# Patient Record
Sex: FEMALE | Race: WHITE | NOT HISPANIC OR LATINO | URBAN - METROPOLITAN AREA
[De-identification: names, ages, dates, MRNs, and addresses within clinical notes are randomized per-mention and may not be internally consistent; named-entity substitution may affect disease eponyms.]

---

## 2018-11-09 ENCOUNTER — TELEPHONE (OUTPATIENT)
Dept: FAMILY MEDICINE | Facility: CLINIC | Age: 58
End: 2018-11-09

## 2018-11-09 NOTE — TELEPHONE ENCOUNTER
Patient states she left a message Monday 11/5 for a refill of her lisinopril 10 mg.  I see no such message in her chart and she is out of medication.  She has been using her husbands medication.  Her records have not been transferred although she does have an appointment on Monday 11/12.  I suggested she contract the pharmacy since Dr Thakkar is out of the office and since her records are not transferred, technically she is still a patient of [facility].  She was upset that the message was not in her chart and will try the pharmacy.

## 2018-11-13 RX ORDER — LISINOPRIL 10 MG/1
10 TABLET ORAL DAILY
Qty: 90 TABLET | Refills: 0 | Status: SHIPPED | OUTPATIENT
Start: 2018-11-13 | End: 2019-01-17

## 2018-11-13 NOTE — TELEPHONE ENCOUNTER
Patient no showed for her visit today, see the note below.  Please see if she is having care provided by another doctor at this time.

## 2018-11-13 NOTE — TELEPHONE ENCOUNTER
Tried to reach the pt today.  Went into VM  Asked that she return my call when she can,  Want to see if she needs assistance getting back into Dr Guillermo schedule, or possibly she is recv care from another provider to which I can advise Dr Thakkar.  My direct line left

## 2018-11-13 NOTE — TELEPHONE ENCOUNTER
Dr Thakkar  Pt called back  Was able to discuss with her multiple things.  She was a little upset at first but after we spoke in depth she felt much better  Pt is still in need of her BP medication,  I clarified the name and the dose, pt take once daily,   And she typically gets a 90 day.  Pharmacy has been updated.    We do not have her records yet, she will fill out authorization for us when she comes in.  Transferred her to the front for them to reschedule her.  Per pt she never recv a reminder call and has been going through a lot with the loss of a parent.   Dr Thakkar script is ready to go if in agreement, please send ty

## 2019-01-17 ENCOUNTER — OFFICE VISIT (OUTPATIENT)
Dept: FAMILY MEDICINE | Facility: CLINIC | Age: 59
End: 2019-01-17
Payer: COMMERCIAL

## 2019-01-17 VITALS
DIASTOLIC BLOOD PRESSURE: 78 MMHG | SYSTOLIC BLOOD PRESSURE: 130 MMHG | HEART RATE: 91 BPM | TEMPERATURE: 98.3 F | RESPIRATION RATE: 16 BRPM | WEIGHT: 185 LBS | OXYGEN SATURATION: 98 %

## 2019-01-17 DIAGNOSIS — J01.90 ACUTE SINUSITIS, RECURRENCE NOT SPECIFIED, UNSPECIFIED LOCATION: Primary | ICD-10-CM

## 2019-01-17 DIAGNOSIS — I10 ESSENTIAL HYPERTENSION: ICD-10-CM

## 2019-01-17 PROBLEM — R01.1 CARDIAC MURMUR: Status: ACTIVE | Noted: 2019-01-17

## 2019-01-17 PROBLEM — F41.9 ANXIETY: Status: ACTIVE | Noted: 2019-01-17

## 2019-01-17 PROBLEM — M17.9 OSTEOARTHRITIS OF KNEE: Status: ACTIVE | Noted: 2019-01-17

## 2019-01-17 PROBLEM — E66.09 OBESITY DUE TO EXCESS CALORIES: Status: ACTIVE | Noted: 2019-01-17

## 2019-01-17 PROCEDURE — 99214 OFFICE O/P EST MOD 30 MIN: CPT | Performed by: FAMILY MEDICINE

## 2019-01-17 RX ORDER — LISINOPRIL 10 MG/1
10 TABLET ORAL DAILY
Qty: 90 TABLET | Refills: 0 | COMMUNITY
Start: 2019-01-17 | End: 2019-01-29 | Stop reason: SDUPTHER

## 2019-01-17 RX ORDER — AZITHROMYCIN 250 MG/1
TABLET, FILM COATED ORAL
Qty: 6 TABLET | Refills: 0 | Status: SHIPPED | OUTPATIENT
Start: 2019-01-17 | End: 2019-02-12 | Stop reason: SDUPTHER

## 2019-01-17 ASSESSMENT — ENCOUNTER SYMPTOMS
SHORTNESS OF BREATH: 0
PALPITATIONS: 0
ARTHRALGIAS: 1
UNEXPECTED WEIGHT CHANGE: 0

## 2019-01-17 NOTE — PROGRESS NOTES
Chief Complaint  Chief Complaint   Patient presents with   • Sinusitis     since the New Year   • earpain     on/off since the New Year       History of Present Illness  Former pt of mine at [facility] and no records avail. Has had 3 weeks of nasal mary ellen, ear pressure, facial pressure and fatigue. Has had cough from PND. Had chills and sweats and in last few days has increased green nasal d/c.           Current Outpatient Prescriptions   Medication Sig Dispense Refill   • lisinopril (PRINIVIL) 10 mg tablet Take 1 tablet (10 mg total) by mouth daily. 90 tablet 0   • azithromycin (ZITHROMAX Z-ELAYNE) 250 mg tablet Take 2 tablets the first day, then 1 tablet daily for 4 days. 6 tablet 0     No current facility-administered medications for this visit.        Patient Active Problem List   Diagnosis   • Essential hypertension       Past Medical History:   Diagnosis Date   • Essential hypertension 1/17/2019   • Hypertension        Past Surgical History:   Procedure Laterality Date   • HERNIA REPAIR     • MOUTH SURGERY         Family History   Problem Relation Age of Onset   • Hypertension Mother    • Thyroid disease Sister    • Hypertension Brother    • Thyroid disease Brother    • Hypertension Maternal Grandfather    • Breast cancer Mother's Sister    • Testicular cancer Cousin        Social History     Social History   • Marital status:      Spouse name: N/A   • Number of children: N/A   • Years of education: N/A     Occupational History   • Not on file.     Social History Main Topics   • Smoking status: Never Smoker   • Smokeless tobacco: Never Used   • Alcohol use Yes      Comment: occass   • Drug use: No   • Sexual activity: Not on file     Other Topics Concern   • Not on file     Social History Narrative   • No narrative on file       Allergies   Allergen Reactions   • Penicillins        Review of Systems   Constitutional: Negative for unexpected weight change.        As in HPI   HENT: Negative for ear pain.     Respiratory: Negative for shortness of breath.    Cardiovascular: Negative for chest pain and palpitations.   Musculoskeletal: Positive for arthralgias.   Skin: Negative for rash.       Vitals:    01/17/19 1048   BP: 130/78   BP Location: Left upper arm   Patient Position: Sitting   Pulse: 91   Resp: 16   Temp: 36.8 °C (98.3 °F)   SpO2: 98%   Weight: 83.9 kg (185 lb)     There is no height or weight on file to calculate BMI.    Physical Exam   Constitutional: She is oriented to person, place, and time. She appears well-developed and well-nourished.   HENT:   Head: Normocephalic and atraumatic.   Right Ear: Tympanic membrane normal.   Left Ear: Tympanic membrane normal.   Nose: Rhinorrhea present.   Mouth/Throat: Oropharynx is clear and moist.   Eyes: Conjunctivae are normal.   Cardiovascular: Normal rate and regular rhythm.    Murmur (2/6) heard.  Pulmonary/Chest: Effort normal and breath sounds normal. She has no wheezes. She has no rales.   Musculoskeletal: She exhibits no edema.   Lymphadenopathy:     She has no cervical adenopathy.   Neurological: She is oriented to person, place, and time.   Skin: No rash noted.   Psychiatric: She has a normal mood and affect. Her behavior is normal.       Problem List Items Addressed This Visit     Essential hypertension     Blood pressure control looks acceptable and continue on the same medication and schedule PE soon.         Relevant Medications    lisinopril (PRINIVIL) 10 mg tablet      Other Visit Diagnoses     Acute sinusitis, recurrence not specified, unspecified location    -  Primary    Treat with antibiotic and counseled and warning signs discussed.  Follow-up if not improving.    Relevant Medications    azithromycin (ZITHROMAX Z-ELAYNE) 250 mg tablet          No Follow-up on file.          Sue Thakkar MD  [facility]  Family Medicine in [place]  [address] [place] [address],  [address]  [place]  [address]  P: [phone]  F: [phone]

## 2019-02-12 ENCOUNTER — OFFICE VISIT (OUTPATIENT)
Dept: FAMILY MEDICINE | Facility: CLINIC | Age: 59
End: 2019-02-12
Payer: COMMERCIAL

## 2019-02-12 ENCOUNTER — TELEPHONE (OUTPATIENT)
Dept: FAMILY MEDICINE | Facility: CLINIC | Age: 59
End: 2019-02-12

## 2019-02-12 VITALS
HEART RATE: 92 BPM | TEMPERATURE: 99.5 F | SYSTOLIC BLOOD PRESSURE: 125 MMHG | DIASTOLIC BLOOD PRESSURE: 70 MMHG | RESPIRATION RATE: 16 BRPM | WEIGHT: 187.8 LBS | OXYGEN SATURATION: 98 %

## 2019-02-12 DIAGNOSIS — J01.90 ACUTE SINUSITIS, RECURRENCE NOT SPECIFIED, UNSPECIFIED LOCATION: ICD-10-CM

## 2019-02-12 DIAGNOSIS — J01.90 ACUTE SINUSITIS, RECURRENCE NOT SPECIFIED, UNSPECIFIED LOCATION: Primary | ICD-10-CM

## 2019-02-12 DIAGNOSIS — Z12.39 BREAST CANCER SCREENING: ICD-10-CM

## 2019-02-12 PROCEDURE — 99214 OFFICE O/P EST MOD 30 MIN: CPT | Performed by: FAMILY MEDICINE

## 2019-02-12 RX ORDER — FLUTICASONE PROPIONATE 50 MCG
1 SPRAY, SUSPENSION (ML) NASAL DAILY
Qty: 1 BOTTLE | Refills: 5 | Status: SHIPPED | OUTPATIENT
Start: 2019-02-12 | End: 2019-07-29

## 2019-02-12 RX ORDER — AZITHROMYCIN 250 MG/1
TABLET, FILM COATED ORAL
Qty: 6 TABLET | Refills: 0 | Status: SHIPPED | OUTPATIENT
Start: 2019-02-12 | End: 2019-02-12

## 2019-02-12 NOTE — PROGRESS NOTES
Chief Complaint  Chief Complaint   Patient presents with   • Fever   • Sinusitis   • Chills       History of Present Illness  Has had 4 days of sinusitis sx with a lot of facial pain and pressure and thick yellow d/c.  Has been prone to sinusitis and had another episode this winter.  Has had ill contacts at work.         Current Outpatient Prescriptions   Medication Sig Dispense Refill   • lisinopril (PRINIVIL) 10 mg tablet Take 1 tablet (10 mg total) by mouth daily. 90 tablet 2   • fluticasone (FLONASE) 50 mcg/actuation nasal spray Administer 1 spray into each nostril daily. 1 Bottle 5     No current facility-administered medications for this visit.        Patient Active Problem List   Diagnosis   • Essential hypertension   • Cardiac murmur   • Osteoarthritis of knee   • Anxiety   • Obesity due to excess calories       Past Medical History:   Diagnosis Date   • Anxiety 1/17/2019   • Cardiac murmur 1/17/2019   • Essential hypertension 1/17/2019   • Hypertension    • Obesity due to excess calories 1/17/2019   • Osteoarthritis of knee 1/17/2019       Past Surgical History:   Procedure Laterality Date   • HERNIA REPAIR     • MOUTH SURGERY         Family History   Problem Relation Age of Onset   • Hypertension Mother    • Thyroid disease Sister    • Hypertension Brother    • Thyroid disease Brother    • Hypertension Maternal Grandfather    • Breast cancer Mother's Sister    • Testicular cancer Cousin    • Colon cancer Father        Social History     Social History   • Marital status:      Spouse name: N/A   • Number of children: N/A   • Years of education: N/A     Occupational History   • Not on file.     Social History Main Topics   • Smoking status: Never Smoker   • Smokeless tobacco: Never Used   • Alcohol use Yes      Comment: occass   • Drug use: No   • Sexual activity: Not on file     Other Topics Concern   • Not on file     Social History Narrative   • No narrative on file       Allergies   Allergen  Reactions   • Penicillins        Review of Systems   Constitutional:        As in HPI       Vitals:    02/12/19 1346   BP: 125/70   BP Location: Right upper arm   Patient Position: Sitting   Pulse: 92   Resp: 16   Temp: 37.5 °C (99.5 °F)   TempSrc: Oral   SpO2: 98%   Weight: 85.2 kg (187 lb 12.8 oz)     There is no height or weight on file to calculate BMI.    Physical Exam   Constitutional: She is oriented to person, place, and time. She appears well-developed and well-nourished.   HENT:   Head: Normocephalic and atraumatic.   Right Ear: Tympanic membrane normal.   Left Ear: Tympanic membrane normal.   Nose: Rhinorrhea present.   Mouth/Throat: Oropharynx is clear and moist.   Eyes: Conjunctivae are normal.   Cardiovascular: Normal rate and regular rhythm.    No murmur heard.  Pulmonary/Chest: Effort normal and breath sounds normal. She has no wheezes. She has no rales.   Musculoskeletal: She exhibits no edema.   Lymphadenopathy:     She has no cervical adenopathy.   Neurological: She is alert and oriented to person, place, and time.   Skin: No rash noted.   Psychiatric: She has a normal mood and affect. Her behavior is normal.       Problem List Items Addressed This Visit     None      Visit Diagnoses     Acute sinusitis, recurrence not specified, unspecified location    -  Primary    Discussed antibiotic therapy, symptomatic measures reviewed, follow-up if recurring.  Add Flonase    Relevant Medications    fluticasone (FLONASE) 50 mcg/actuation nasal spray    Breast cancer screening        Relevant Orders    BI SCREENING MAMMOGRAM BILATERAL      Rx for Zithromax also sent.    Return in about 3 weeks (around 3/5/2019).   Due for aloe up of chronic conditions and asked her to have prior records forwarded.          Sue Thakkar MD  [facility]  Family Medicine in [place]  [address] [place] [address],  [address]  [place]  [address]  P: [phone]  F: [phone]

## 2019-04-11 ENCOUNTER — OFFICE VISIT (OUTPATIENT)
Dept: FAMILY MEDICINE | Facility: CLINIC | Age: 59
End: 2019-04-11
Payer: COMMERCIAL

## 2019-04-11 VITALS
DIASTOLIC BLOOD PRESSURE: 70 MMHG | BODY MASS INDEX: 34.82 KG/M2 | RESPIRATION RATE: 16 BRPM | WEIGHT: 189.2 LBS | HEIGHT: 62 IN | OXYGEN SATURATION: 98 % | SYSTOLIC BLOOD PRESSURE: 140 MMHG | TEMPERATURE: 98.1 F | HEART RATE: 83 BPM

## 2019-04-11 DIAGNOSIS — J02.9 SORETHROAT: ICD-10-CM

## 2019-04-11 DIAGNOSIS — R68.89 FLU-LIKE SYMPTOMS: Primary | ICD-10-CM

## 2019-04-11 LAB
RAPID INFLUENZA A AGN: POSITIVE
RAPID INFLUENZA B AGN: NEGATIVE
S PYO AG THROAT QL: NEGATIVE

## 2019-04-11 PROCEDURE — 87804 INFLUENZA ASSAY W/OPTIC: CPT | Performed by: NURSE PRACTITIONER

## 2019-04-11 PROCEDURE — 87880 STREP A ASSAY W/OPTIC: CPT | Performed by: NURSE PRACTITIONER

## 2019-04-11 PROCEDURE — 99213 OFFICE O/P EST LOW 20 MIN: CPT | Performed by: NURSE PRACTITIONER

## 2019-04-11 RX ORDER — AZITHROMYCIN 250 MG/1
TABLET, FILM COATED ORAL
Qty: 6 TABLET | Refills: 0 | Status: SHIPPED | OUTPATIENT
Start: 2019-04-11 | End: 2019-05-15 | Stop reason: ALTCHOICE

## 2019-04-11 ASSESSMENT — ENCOUNTER SYMPTOMS
VOMITING: 0
NECK PAIN: 0
CHILLS: 1
ABDOMINAL PAIN: 0
DIARRHEA: 1
SINUS PRESSURE: 0
HEADACHES: 0
TROUBLE SWALLOWING: 0
SINUS PAIN: 0
WHEEZING: 0
SORE THROAT: 1
LIGHT-HEADEDNESS: 0
STRIDOR: 0
FEVER: 1
NAUSEA: 0
HOARSE VOICE: 0
COUGH: 1
FATIGUE: 1
MYALGIAS: 0
SWOLLEN GLANDS: 1
CHEST TIGHTNESS: 0
WOUND: 0
PALPITATIONS: 0
SHORTNESS OF BREATH: 0
DIZZINESS: 0

## 2019-04-11 NOTE — PROGRESS NOTES
Subjective      Patient ID: Leny Burton is a 58 y.o. female.  1960      Patient presents for flu-like symptoms.   Patient has been feeling warm and feverish that 5 days ago and sweats is worse at night.    Then started with sore throat, swollen glands, left ear pain on and off.  Patient has taken OTC ibuprofen only.  Associated symptoms include decreased appetite.  Did not get flu vaccine this year.  Her Co-worker has flu and pneumonia and she has also been around sick kids.       Sore Throat    Associated symptoms include coughing (dry non productive cough), diarrhea (mild diarrhea), ear pain, a plugged ear sensation and swollen glands. Pertinent negatives include no abdominal pain, congestion, drooling, ear discharge, headaches, hoarse voice, neck pain, shortness of breath, stridor, trouble swallowing or vomiting. She has tried NSAIDs for the symptoms.       The following have been reviewed and updated as appropriate in this visit:  Allergies       Review of Systems   Constitutional: Positive for chills, fatigue and fever.   HENT: Positive for ear pain, postnasal drip and sore throat. Negative for congestion, drooling, ear discharge, hoarse voice, sinus pain, sinus pressure and trouble swallowing.    Respiratory: Positive for cough (dry non productive cough). Negative for chest tightness, shortness of breath, wheezing and stridor.    Cardiovascular: Negative for chest pain and palpitations.   Gastrointestinal: Positive for diarrhea (mild diarrhea). Negative for abdominal pain, nausea and vomiting.   Musculoskeletal: Negative for myalgias and neck pain.   Skin: Negative for rash and wound.   Neurological: Negative for dizziness, light-headedness and headaches.     @problemlist@  Past Medical History:   Diagnosis Date   • Anxiety 1/17/2019   • Cardiac murmur 1/17/2019   • Essential hypertension 1/17/2019   • Hypertension    • Obesity due to excess calories 1/17/2019   • Osteoarthritis of knee 1/17/2019  "    Past Surgical History:   Procedure Laterality Date   • HERNIA REPAIR     • MOUTH SURGERY       Social History     Social History   • Marital status:      Spouse name: N/A   • Number of children: N/A   • Years of education: N/A     Social History Main Topics   • Smoking status: Never Smoker   • Smokeless tobacco: Never Used   • Alcohol use Yes      Comment: occass   • Drug use: No   • Sexual activity: Not Asked     Other Topics Concern   • None     Social History Narrative   • None     Objective     Vitals:    04/11/19 1058   BP: 140/70   BP Location: Left upper arm   Patient Position: Sitting   Pulse: 83   Resp: 16   Temp: 36.7 °C (98.1 °F)   TempSrc: Oral   SpO2: 98%   Weight: 85.8 kg (189 lb 3.2 oz)   Height: 1.575 m (5' 2.01\")     Body mass index is 34.6 kg/m².  Current Outpatient Prescriptions   Medication Sig Dispense Refill   • fluticasone (FLONASE) 50 mcg/actuation nasal spray Administer 1 spray into each nostril daily. 1 Bottle 5   • lisinopril (PRINIVIL) 10 mg tablet Take 1 tablet (10 mg total) by mouth daily. 90 tablet 2   • azithromycin (ZITHROMAX) 250 mg tablet Take 2 tablets the first day, then 1 tablet daily for 4 days. 6 tablet 0     No current facility-administered medications for this visit.        Physical Exam   Constitutional: She is oriented to person, place, and time. She appears well-developed and well-nourished.   HENT:   Head: Normocephalic and atraumatic.   Right Ear: External ear and ear canal normal. A middle ear effusion is present.   Left Ear: External ear and ear canal normal. A middle ear effusion is present.   Nose: Mucosal edema present. Right sinus exhibits no maxillary sinus tenderness and no frontal sinus tenderness. Left sinus exhibits no maxillary sinus tenderness and no frontal sinus tenderness.   Mouth/Throat: Oropharynx is clear and moist.   Eyes: Conjunctivae and EOM are normal.   Neck: Normal range of motion. Neck supple.   Cardiovascular: Normal rate, regular " rhythm and normal heart sounds.    Pulmonary/Chest: Effort normal and breath sounds normal. She has no wheezes.   Abdominal: Soft. Bowel sounds are normal.   Musculoskeletal: Normal range of motion.   Lymphadenopathy:     Cervical adenopathy: bilateral tonsillar adenopathy.   Neurological: She is alert and oriented to person, place, and time.   Skin: Skin is warm and dry.   Psychiatric: She has a normal mood and affect. Her behavior is normal. Judgment and thought content normal.       Assessment/Plan     Problem List Items Addressed This Visit     None      Visit Diagnoses     Flu-like symptoms    -  Primary    Rapid flu positive for Type A.  Discussed risk vs. benefits tamiflu, patinet declined.  Printed rx for zpak provided if symptoms should worsen over the weekend. Recommended rest, Tylenol/Advil, increased fluids, claritin.    Relevant Orders    POCT Influenza A/B (Completed)    Sorethroat        Rapid strep negative.    Relevant Orders    POCT rapid strep A (Completed)

## 2019-04-11 NOTE — LETTER
April 11, 2019     Patient: Leny Burton   YOB: 1960   Date of Visit: 4/11/2019       To Whom it May Concern:    Leny Burton was seen in my clinic on 4/11/2019 at 10:40 am. Please excuse Leny for her absence from work on this day to make the appointment, as well as tomorrow she can recover from her illness.     If you have any questions or concerns, please don't hesitate to call.         Sincerely,         ILANA Joaquin        CC: No Recipients

## 2019-04-11 NOTE — PATIENT INSTRUCTIONS
"Rapid influenza positive for Type A.    Influenza is an acute, self-limiting, febrile illness of the respiratory tract.  You are contagious for 24-48 hours before feeling symptoms, and you are contagious for up to 7 days after symptoms began.  Coughing and sneezing will spread the flu.  There are many flu viruses and a flu shot may not 100% prevent transmission of the flu, but often will lessen the severity of symptoms.  Hot tea, soup, and throat lozenges can help soothe your throat.   To help with pain, you can alternative Advil (do not exceed more than 2400mg/day) with Tylenol (do not exceed more than 4000mg/day).  If  you have difficulty breathing because of sore throat or enlarged tonsils, or your symptoms worsen over the next 24 hours, please call the office.  Avoid contact with others, covering your mouth while coughing or sneezing, and dispose of tissues promptly.  Frequent handwashing is recommended.  Rest and drink lots of fluids. If you do not feel like eating a meal you may substitute a sports drink such as Gatorade.      Antibiotics will not help the fluids it is a viral illness, but if symptoms continue to worsen then start zithromax.  Print script provided.     Notify the office if you have any increase of fever, shortness of breath or chest pain, bloody mucus, neck pain, or other new or unexplained symptoms.    Patient Education     Influenza, Adult  Influenza (“the flu\") is an infection in the lungs, nose, and throat (respiratory tract). It is caused by a virus. The flu causes many common cold symptoms, as well as a high fever and body aches. It can make you feel very sick.  The flu spreads easily from person to person (is contagious). Getting a flu shot (influenza vaccination) every year is the best way to prevent the flu.  Follow these instructions at home:  · Take over-the-counter and prescription medicines only as told by your doctor.  · Use a cool mist humidifier to add moisture (humidity) to " the air in your home. This can make it easier to breathe.  · Rest as needed.  · Drink enough fluid to keep your pee (urine) clear or pale yellow.  · Cover your mouth and nose when you cough or sneeze.  · Wash your hands with soap and water often, especially after you cough or sneeze. If you cannot use soap and water, use hand .  · Stay home from work or school as told by your doctor. Unless you are visiting your doctor, try to avoid leaving home until your fever has been gone for 24 hours without the use of medicine.  · Keep all follow-up visits as told by your doctor. This is important.  How is this prevented?  · Getting a yearly (annual) flu shot is the best way to avoid getting the flu. You may get the flu shot in late summer, fall, or winter. Ask your doctor when you should get your flu shot.  · Wash your hands often or use hand  often.  · Avoid contact with people who are sick during cold and flu season.  · Eat healthy foods.  · Drink plenty of fluids.  · Get enough sleep.  · Exercise regularly.  Contact a doctor if:  · You get new symptoms.  · You have:  ¨ Chest pain.  ¨ Watery poop (diarrhea).  ¨ A fever.  · Your cough gets worse.  · You start to have more mucus.  · You feel sick to your stomach (nauseous).  · You throw up (vomit).  Get help right away if:  · You start to be short of breath or have trouble breathing.  · Your skin or nails turn a bluish color.  · You have very bad pain or stiffness in your neck.  · You get a sudden headache.  · You get sudden pain in your face or ear.  · You cannot stop throwing up.  This information is not intended to replace advice given to you by your health care provider. Make sure you discuss any questions you have with your health care provider.  Document Released: 09/26/2009 Document Revised: 05/25/2017 Document Reviewed: 10/11/2016  [employer] Interactive Patient Education © 2017 [employer]

## 2019-05-15 ENCOUNTER — OFFICE VISIT (OUTPATIENT)
Dept: FAMILY MEDICINE | Facility: CLINIC | Age: 59
End: 2019-05-15
Payer: COMMERCIAL

## 2019-05-15 VITALS
RESPIRATION RATE: 18 BRPM | DIASTOLIC BLOOD PRESSURE: 80 MMHG | OXYGEN SATURATION: 98 % | SYSTOLIC BLOOD PRESSURE: 120 MMHG | HEIGHT: 62 IN | HEART RATE: 86 BPM | WEIGHT: 180 LBS | TEMPERATURE: 98.2 F | BODY MASS INDEX: 33.13 KG/M2

## 2019-05-15 DIAGNOSIS — R68.89 FLU-LIKE SYMPTOMS: Primary | ICD-10-CM

## 2019-05-15 DIAGNOSIS — R53.83 FATIGUE, UNSPECIFIED TYPE: ICD-10-CM

## 2019-05-15 LAB
EXPIRATION DATE: NORMAL
Lab: NORMAL
POCT MANUFACTURER: NORMAL
RAPID INFLUENZA A AGN: NORMAL
RAPID INFLUENZA B AGN: NORMAL

## 2019-05-15 PROCEDURE — 87804 INFLUENZA ASSAY W/OPTIC: CPT | Performed by: NURSE PRACTITIONER

## 2019-05-15 PROCEDURE — 99213 OFFICE O/P EST LOW 20 MIN: CPT | Performed by: NURSE PRACTITIONER

## 2019-05-15 ASSESSMENT — ENCOUNTER SYMPTOMS
ARTHRALGIAS: 0
DIARRHEA: 0
HEADACHES: 0
ADENOPATHY: 0
EYE PAIN: 0
ACTIVITY CHANGE: 0
EYE REDNESS: 0
DYSURIA: 0
SINUS PAIN: 0
SWOLLEN GLANDS: 0
POLYDIPSIA: 0
WHEEZING: 0
TROUBLE SWALLOWING: 0
RHINORRHEA: 0
WEAKNESS: 0
FEVER: 0
UNEXPECTED WEIGHT CHANGE: 0
VOMITING: 0
FREQUENCY: 0
MYALGIAS: 0
FACIAL ASYMMETRY: 0
SORE THROAT: 0
CHILLS: 0
FATIGUE: 1
DIAPHORESIS: 1
APPETITE CHANGE: 0
BRUISES/BLEEDS EASILY: 0
DIZZINESS: 0
JOINT SWELLING: 0
COUGH: 1
FACIAL SWELLING: 0
NAUSEA: 0
ABDOMINAL PAIN: 0
PHOTOPHOBIA: 0
SHORTNESS OF BREATH: 0
POLYPHAGIA: 0
PALPITATIONS: 0
LIGHT-HEADEDNESS: 0
NECK PAIN: 0
SINUS PRESSURE: 0

## 2019-05-15 NOTE — LETTER
May 15, 2019     Patient: Leny Burton   YOB: 1960   Date of Visit: 5/15/2019       To Whom it May Concern:    Leny Burton was seen in my clinic on 5/15/2019 at 1:00 pm. Please excuse eLny for her absence from work on this day to make the appointment.    If you have any questions or concerns, please don't hesitate to call.         Sincerely,         ILANA Piper        CC: No Recipients

## 2019-05-15 NOTE — PROGRESS NOTES
Subjective      Patient ID: Leny Burton is a 58 y.o. female.  1960      Pt c/o 5 days of sweats, pn drip, scratchy voice, fatigue, clearing cough. Pt is c/o mostly of fatigue and sweats. Pt states cough is greatly improved from day 1. Pt states this is the 4th time since January that she has been here for URI illness. Pt states she is using flonase 1 spray/nostril daily. No seasonal allergies. No sick contacts or family members. Pt denies CP/SOB, pain, myalgias, HA, sinus pain/pressure.       URI    This is a new problem. The current episode started in the past 7 days. The problem has been gradually improving. There has been no fever. Associated symptoms include congestion (mild) and coughing (clearing). Pertinent negatives include no abdominal pain, chest pain, diarrhea, dysuria, ear pain, headaches, joint pain, joint swelling, nausea, neck pain, plugged ear sensation, rash, rhinorrhea, sinus pain, sneezing, sore throat, swollen glands, vomiting or wheezing. Treatments tried: flonase. The treatment provided mild relief.       The following have been reviewed and updated as appropriate in this visit:  Allergies  Meds  Problems       Review of Systems   Constitutional: Positive for diaphoresis (intermittently) and fatigue. Negative for activity change, appetite change, chills, fever and unexpected weight change.   HENT: Positive for congestion (mild) and postnasal drip. Negative for ear pain, facial swelling, mouth sores, nosebleeds, rhinorrhea, sinus pain, sinus pressure, sneezing, sore throat, tinnitus and trouble swallowing.    Eyes: Negative for photophobia, pain and redness.   Respiratory: Positive for cough (clearing). Negative for shortness of breath and wheezing.    Cardiovascular: Negative for chest pain and palpitations.   Gastrointestinal: Negative for abdominal pain, diarrhea, nausea and vomiting.   Endocrine: Negative for polydipsia, polyphagia and polyuria.   Genitourinary: Negative for  "dysuria, frequency and urgency.   Musculoskeletal: Negative for arthralgias, gait problem, joint pain, joint swelling, myalgias and neck pain.   Skin: Negative for rash.   Allergic/Immunologic: Negative for environmental allergies.   Neurological: Negative for dizziness, facial asymmetry, weakness, light-headedness and headaches.   Hematological: Negative for adenopathy. Does not bruise/bleed easily.       Objective     Vitals:    05/15/19 1304   BP: 120/80   BP Location: Left upper arm   Patient Position: Sitting   Pulse: 86   Resp: 18   Temp: 36.8 °C (98.2 °F)   TempSrc: Oral   SpO2: 98%   Weight: 81.6 kg (180 lb)   Height: 1.575 m (5' 2.01\")     Body mass index is 32.91 kg/m².    Physical Exam   Constitutional: She is oriented to person, place, and time. She appears well-developed and well-nourished. No distress.   HENT:   Head: Normocephalic.   Right Ear: Hearing, tympanic membrane, external ear and ear canal normal.   Left Ear: Hearing, tympanic membrane, external ear and ear canal normal.   Nose: Mucosal edema present. No rhinorrhea. Right sinus exhibits no maxillary sinus tenderness and no frontal sinus tenderness. Left sinus exhibits no maxillary sinus tenderness and no frontal sinus tenderness.   Mouth/Throat: Uvula is midline, oropharynx is clear and moist and mucous membranes are normal. No oropharyngeal exudate, posterior oropharyngeal edema, posterior oropharyngeal erythema or tonsillar abscesses. Tonsils are 0 on the right. Tonsils are 0 on the left. No tonsillar exudate.       Eyes: Pupils are equal, round, and reactive to light.   Neck: Normal range of motion. Neck supple. No thyromegaly present.   Cardiovascular: Normal rate, regular rhythm and normal heart sounds.    Pulmonary/Chest: Effort normal and breath sounds normal.   Lymphadenopathy:     She has no cervical adenopathy.   Neurological: She is alert and oriented to person, place, and time.   Skin: Skin is warm and dry. Capillary refill takes " less than 2 seconds.   Psychiatric: She has a normal mood and affect. Her behavior is normal.   Vitals reviewed.      Assessment/Plan   Diagnoses and all orders for this visit:    Flu-like symptoms (Primary)  -     POCT Influenza A/B    Fatigue, unspecified type  -     CBC and differential; Future  -     Comprehensive metabolic panel; Future  -     TSH w reflex FT4; Future  -     Vitamin B12; Future  -     Hemoglobin A1c; Future  -     Lyme EIA reflex WB; Future  -     Mica-Barr virus VCA antibody panel; Future  -     CHINO; Future  -     Sedimentation rate, automated; Future  -     Rheumatoid factor; Future  -     C-reactive protein; Future    Flu negative. Likely Viral URI, increase flonase to 2 sprays/nostril daily. Pt may try otc Coricidin HBP prn. Increase po fluids and rest. Will check labs due to FHx of DM and thyroid dz and her significant fatigue/sweats and multiple episodes of recent illness. Pt to continue to monitor sxs and Pt to RTC/call if no improvement 72 hrs or sooner for any concerns or worsening sxs. Pt agrees with plan.

## 2019-05-15 NOTE — Clinical Note
May 15, 2019     Patient: Leny Burton   YOB: 1960   Date of Visit: 5/15/2019       To Whom it May Concern:    Leny Burton was seen in my clinic on 5/15/2019 at . Please excuse Leny for her absence from school on this day to make the appointment.    If you have any questions or concerns, please don't hesitate to call.         Sincerely,         ILANA Piper        CC: No Recipients

## 2019-05-15 NOTE — PATIENT INSTRUCTIONS
"  Patient Education     Upper Respiratory Infection, Adult  Most upper respiratory infections (URIs) are a viral infection of the air passages leading to the lungs. A URI affects the nose, throat, and upper air passages. The most common type of URI is nasopharyngitis and is typically referred to as \"the common cold.\"  URIs run their course and usually go away on their own. Most of the time, a URI does not require medical attention, but sometimes a bacterial infection in the upper airways can follow a viral infection. This is called a secondary infection. Sinus and middle ear infections are common types of secondary upper respiratory infections.  Bacterial pneumonia can also complicate a URI. A URI can worsen asthma and chronic obstructive pulmonary disease (COPD). Sometimes, these complications can require emergency medical care and may be life threatening.  What are the causes?  Almost all URIs are caused by viruses. A virus is a type of germ and can spread from one person to another.  What increases the risk?  You may be at risk for a URI if:  · You smoke.  · You have chronic heart or lung disease.  · You have a weakened defense (immune) system.  · You are very young or very old.  · You have nasal allergies or asthma.  · You work in crowded or poorly ventilated areas.  · You work in health care facilities or schools.  What are the signs or symptoms?  Symptoms typically develop 2-3 days after you come in contact with a cold virus. Most viral URIs last 7-10 days. However, viral URIs from the influenza virus (flu virus) can last 14-18 days and are typically more severe. Symptoms may include:  · Runny or stuffy (congested) nose.  · Sneezing.  · Cough.  · Sore throat.  · Headache.  · Fatigue.  · Fever.  · Loss of appetite.  · Pain in your forehead, behind your eyes, and over your cheekbones (sinus pain).  · Muscle aches.  How is this diagnosed?  Your health care provider may diagnose a URI by:  · Physical " exam.  · Tests to check that your symptoms are not due to another condition such as:  ¨ Strep throat.  ¨ Sinusitis.  ¨ Pneumonia.  ¨ Asthma.  How is this treated?  A URI goes away on its own with time. It cannot be cured with medicines, but medicines may be prescribed or recommended to relieve symptoms. Medicines may help:  · Reduce your fever.  · Reduce your cough.  · Relieve nasal congestion.  Follow these instructions at home:  · Take medicines only as directed by your health care provider.  · Gargle warm saltwater or take cough drops to comfort your throat as directed by your health care provider.  · Use a warm mist humidifier or inhale steam from a shower to increase air moisture. This may make it easier to breathe.  · Drink enough fluid to keep your urine clear or pale yellow.  · Eat soups and other clear broths and maintain good nutrition.  · Rest as needed.  · Return to work when your temperature has returned to normal or as your health care provider advises. You may need to stay home longer to avoid infecting others. You can also use a face mask and careful hand washing to prevent spread of the virus.  · Increase the usage of your inhaler if you have asthma.  · Do not use any tobacco products, including cigarettes, chewing tobacco, or electronic cigarettes. If you need help quitting, ask your health care provider.  How is this prevented?  The best way to protect yourself from getting a cold is to practice good hygiene.  · Avoid oral or hand contact with people with cold symptoms.  · Wash your hands often if contact occurs.  There is no clear evidence that vitamin C, vitamin E, echinacea, or exercise reduces the chance of developing a cold. However, it is always recommended to get plenty of rest, exercise, and practice good nutrition.  Contact a health care provider if:  · You are getting worse rather than better.  · Your symptoms are not controlled by medicine.  · You have chills.  · You have worsening  shortness of breath.  · You have brown or red mucus.  · You have yellow or brown nasal discharge.  · You have pain in your face, especially when you bend forward.  · You have a fever.  · You have swollen neck glands.  · You have pain while swallowing.  · You have white areas in the back of your throat.  Get help right away if:  · You have severe or persistent:  ¨ Headache.  ¨ Ear pain.  ¨ Sinus pain.  ¨ Chest pain.  · You have chronic lung disease and any of the following:  ¨ Wheezing.  ¨ Prolonged cough.  ¨ Coughing up blood.  ¨ A change in your usual mucus.  · You have a stiff neck.  · You have changes in your:  ¨ Vision.  ¨ Hearing.  ¨ Thinking.  ¨ Mood.  This information is not intended to replace advice given to you by your health care provider. Make sure you discuss any questions you have with your health care provider.  Document Released: 06/13/2002 Document Revised: 08/20/2017 Document Reviewed: 03/25/2015  Elsevier Interactive Patient Education © 2017 [employer]

## 2019-05-15 NOTE — LETTER
May 15, 2019     Patient: Leny Burton   YOB: 1960   Date of Visit: 5/15/2019       To Whom it May Concern:    Leny Burton was seen in my clinic on 5/15/2019 at 1:00 pm. Please excuse Leny for her absence from work on this day to make the appointment.    If you have any questions or concerns, please don't hesitate to call.         Sincerely,         ILANA Piper        CC: No Recipients

## 2019-05-24 LAB
BASOPHILS # BLD AUTO: 0 X10E3/UL (ref 0–0.2)
BASOPHILS NFR BLD AUTO: 1 %
EOSINOPHIL # BLD AUTO: 0.2 X10E3/UL (ref 0–0.4)
EOSINOPHIL NFR BLD AUTO: 3 %
ERYTHROCYTE [DISTWIDTH] IN BLOOD BY AUTOMATED COUNT: 14.2 % (ref 12.3–15.4)
ERYTHROCYTE [SEDIMENTATION RATE] IN BLOOD BY WESTERGREN METHOD: 20 MM/HR (ref 0–40)
HCT VFR BLD AUTO: 38.9 % (ref 34–46.6)
HGB BLD-MCNC: 13 G/DL (ref 11.1–15.9)
IMM GRANULOCYTES # BLD AUTO: 0 X10E3/UL (ref 0–0.1)
IMM GRANULOCYTES NFR BLD AUTO: 0 %
LYMPHOCYTES # BLD AUTO: 1.4 X10E3/UL (ref 0.7–3.1)
LYMPHOCYTES NFR BLD AUTO: 20 %
MCH RBC QN AUTO: 28.7 PG (ref 26.6–33)
MCHC RBC AUTO-ENTMCNC: 33.4 G/DL (ref 31.5–35.7)
MCV RBC AUTO: 86 FL (ref 79–97)
MONOCYTES # BLD AUTO: 0.6 X10E3/UL (ref 0.1–0.9)
MONOCYTES NFR BLD AUTO: 8 %
NEUTROPHILS # BLD AUTO: 4.6 X10E3/UL (ref 1.4–7)
NEUTROPHILS NFR BLD AUTO: 68 %
PLATELET # BLD AUTO: 257 X10E3/UL (ref 150–450)
RBC # BLD AUTO: 4.53 X10E6/UL (ref 3.77–5.28)
WBC # BLD AUTO: 6.9 X10E3/UL (ref 3.4–10.8)

## 2019-05-25 LAB
ALBUMIN SERPL-MCNC: 4 G/DL (ref 3.5–5.5)
ALBUMIN/GLOB SERPL: 1.4 {RATIO} (ref 1.2–2.2)
ALP SERPL-CCNC: 94 IU/L (ref 39–117)
ALT SERPL-CCNC: 20 IU/L (ref 0–32)
ANA TITR SER IF: NEGATIVE {TITER}
AST SERPL-CCNC: 21 IU/L (ref 0–40)
BILIRUB SERPL-MCNC: 0.3 MG/DL (ref 0–1.2)
BUN SERPL-MCNC: 16 MG/DL (ref 6–24)
BUN/CREAT SERPL: 21 (ref 9–23)
CALCIUM SERPL-MCNC: 9.4 MG/DL (ref 8.7–10.2)
CHLORIDE SERPL-SCNC: 107 MMOL/L (ref 96–106)
CO2 SERPL-SCNC: 22 MMOL/L (ref 20–29)
CREAT SERPL-MCNC: 0.76 MG/DL (ref 0.57–1)
CRP SERPL-MCNC: 3 MG/L (ref 0–4.9)
EBV EA IGG SER-ACNC: <9 U/ML (ref 0–8.9)
EBV NA IGG SER IA-ACNC: >600 U/ML (ref 0–17.9)
EBV VCA IGG SER IA-ACNC: 152 U/ML (ref 0–17.9)
EBV VCA IGM SER IA-ACNC: <36 U/ML (ref 0–35.9)
GLOBULIN SER CALC-MCNC: 2.8 G/DL (ref 1.5–4.5)
GLUCOSE SERPL-MCNC: 97 MG/DL (ref 65–99)
HBA1C MFR BLD: 5.6 % (ref 4.8–5.6)
LAB CORP EGFR IF AFRICN AM: 99 ML/MIN/1.73
LAB CORP EGFR IF NONAFRICN AM: 86 ML/MIN/1.73
LAB CORP PLEASE NOTE:: NORMAL
POTASSIUM SERPL-SCNC: 4.5 MMOL/L (ref 3.5–5.2)
PROT SERPL-MCNC: 6.8 G/DL (ref 6–8.5)
RHEUMATOID FACT SERPL-ACNC: 10.2 IU/ML (ref 0–13.9)
SERVICE CMNT-IMP: ABNORMAL
SODIUM SERPL-SCNC: 145 MMOL/L (ref 134–144)
T4 FREE SERPL-MCNC: 1.14 NG/DL (ref 0.82–1.77)
TSH SERPL DL<=0.005 MIU/L-ACNC: 2.9 UIU/ML (ref 0.45–4.5)
VIT B12 SERPL-MCNC: 439 PG/ML (ref 232–1245)

## 2019-05-28 LAB
B BURGDOR IGG PATRN SER IB-IMP: NEGATIVE
B BURGDOR IGG+IGM SER-ACNC: 1.84 ISR (ref 0–0.9)
B BURGDOR IGM PATRN SER IB-IMP: NEGATIVE
B BURGDOR IGM SER IA-ACNC: <0.8 INDEX (ref 0–0.79)
B BURGDOR18KD IGG SER QL IB: NORMAL
B BURGDOR23KD IGG SER QL IB: NORMAL
B BURGDOR23KD IGM SER QL IB: NORMAL
B BURGDOR28KD IGG SER QL IB: NORMAL
B BURGDOR30KD IGG SER QL IB: NORMAL
B BURGDOR39KD IGG SER QL IB: NORMAL
B BURGDOR39KD IGM SER QL IB: NORMAL
B BURGDOR41KD IGG SER QL IB: NORMAL
B BURGDOR41KD IGM SER QL IB: NORMAL
B BURGDOR45KD IGG SER QL IB: NORMAL
B BURGDOR58KD IGG SER QL IB: NORMAL
B BURGDOR66KD IGG SER QL IB: NORMAL
B BURGDOR93KD IGG SER QL IB: NORMAL

## 2019-05-29 ENCOUNTER — TELEPHONE (OUTPATIENT)
Dept: FAMILY MEDICINE | Facility: CLINIC | Age: 59
End: 2019-05-29

## 2019-05-30 NOTE — TELEPHONE ENCOUNTER
Spoke with patient and reviewed labs with patient.  She is aware of her lab results and that they look good.  She is aware that she has a previous exposure to Mono in the past.  Told patient that if the fatigue continue she should make an appointment with Dr. Thakkar for a PE.

## 2019-07-29 ENCOUNTER — OFFICE VISIT (OUTPATIENT)
Dept: FAMILY MEDICINE | Facility: CLINIC | Age: 59
End: 2019-07-29
Payer: COMMERCIAL

## 2019-07-29 VITALS
HEART RATE: 89 BPM | BODY MASS INDEX: 36.14 KG/M2 | DIASTOLIC BLOOD PRESSURE: 70 MMHG | TEMPERATURE: 98.4 F | WEIGHT: 196.4 LBS | OXYGEN SATURATION: 97 % | HEIGHT: 62 IN | SYSTOLIC BLOOD PRESSURE: 120 MMHG | RESPIRATION RATE: 20 BRPM

## 2019-07-29 DIAGNOSIS — Z12.11 COLON CANCER SCREENING: ICD-10-CM

## 2019-07-29 DIAGNOSIS — M25.562 LEFT KNEE PAIN, UNSPECIFIED CHRONICITY: Primary | ICD-10-CM

## 2019-07-29 DIAGNOSIS — I10 ESSENTIAL HYPERTENSION: ICD-10-CM

## 2019-07-29 PROCEDURE — 99214 OFFICE O/P EST MOD 30 MIN: CPT | Performed by: FAMILY MEDICINE

## 2019-07-29 NOTE — ASSESSMENT & PLAN NOTE
Controlled.  Same medication she also has not had a lipid panel.  She will proceed with doing this fasting.  Other recent labs looked okay.  She will follow-up with you soon as possible.

## 2019-07-29 NOTE — PROGRESS NOTES
Chief Complaint  Chief Complaint   Patient presents with   • Knee Pain     swollen, pain x 5 days       History of Present Illness  Has hx of chronic left knee pain for many yrs, and known severe DJD and pain worsening and had recent swelling  and decided she is ready for consultation to consider knee replacement surgery.  Last x rays a few yrs ago.  Saw  in the past.  Interested in seeing Dr Bird for consult.  She did just have recent dental care and dental cleaning.  She has never had colonoscopy and again encouraged her to follow through with this ASAP.  She is very overdue for physical and encouraged her to schedule this as well if she is contemplating surgery in the near future.  We did not have her medical records and encouraged her to get those as well.            Current Outpatient Prescriptions   Medication Sig Dispense Refill   • fluticasone (FLONASE) 50 mcg/actuation nasal spray Administer 1 spray into each nostril daily. 1 Bottle 5   • lisinopril (PRINIVIL) 10 mg tablet Take 1 tablet (10 mg total) by mouth daily. 90 tablet 2     No current facility-administered medications for this visit.        Patient Active Problem List   Diagnosis   • Essential hypertension   • Cardiac murmur   • Osteoarthritis of knee   • Anxiety   • Obesity due to excess calories       Past Medical History:   Diagnosis Date   • Anxiety 1/17/2019   • Cardiac murmur 1/17/2019   • Essential hypertension 1/17/2019   • Hypertension    • Obesity due to excess calories 1/17/2019   • Osteoarthritis of knee 1/17/2019       Past Surgical History:   Procedure Laterality Date   • HERNIA REPAIR     • MOUTH SURGERY         Family History   Problem Relation Age of Onset   • Hypertension Mother    • Thyroid disease Sister    • Hypertension Brother    • Thyroid disease Brother    • Colon cancer Brother    • Hypertension Maternal Grandfather    • Breast cancer Mother's Sister    • Testicular cancer Cousin    • Colon cancer Father   "      Social History     Social History   • Marital status:      Spouse name: N/A   • Number of children: N/A   • Years of education: N/A     Occupational History   • Not on file.     Social History Main Topics   • Smoking status: Never Smoker   • Smokeless tobacco: Never Used   • Alcohol use Yes      Comment: occass   • Drug use: No   • Sexual activity: Not on file     Other Topics Concern   • Not on file     Social History Narrative   • No narrative on file       Allergies   Allergen Reactions   • Penicillins      Yeast infection       Review of Systems   Constitutional:        As in HPI       Vitals:    07/29/19 1055   BP: 120/70   BP Location: Left upper arm   Patient Position: Sitting   Pulse: 89   Resp: 20   Temp: 36.9 °C (98.4 °F)   TempSrc: Oral   SpO2: 97%   Weight: 89.1 kg (196 lb 6.4 oz)   Height: 1.575 m (5' 2\")     Body mass index is 35.92 kg/m².    Physical Exam   Constitutional: She is oriented to person, place, and time. She appears well-developed and well-nourished.   Cardiovascular: Normal rate and regular rhythm.    Pulmonary/Chest: Effort normal and breath sounds normal.   Musculoskeletal:   Left knee with some pain on range of motion and she felt a small joint effusion with no erythema or warmth.   Neurological: She is alert and oriented to person, place, and time.   Skin: No rash noted.   Psychiatric: She has a normal mood and affect. Her behavior is normal.   Nursing note and vitals reviewed.      Problem List Items Addressed This Visit     Essential hypertension     Controlled.  Same medication she also has not had a lipid panel.  She will proceed with doing this fasting.  Other recent labs looked okay.  She will follow-up with you soon as possible.         Relevant Orders    Lipid Prof w Refl      Other Visit Diagnoses     Left knee pain, unspecified chronicity    -  Primary    Referred to Dr. Bird for consultation and contemplating proceeding with knee replacement surgery.    " Relevant Orders    Ambulatory referral to Orthopedic Surgery    Colon cancer screening        Is still not scheduled colonoscopy and again encouraged her to follow through.  Would be very helpful to do this prior to any joint replacement surgery    Relevant Orders    Direct Access Colonoscopy BMMSA          Return for PE soon.          Sue Thakkar MD  [facility]  Family Medicine in [place]  [address],  [address]  [place]  [address]  P:   F: [phone]

## 2019-08-12 ENCOUNTER — OFFICE VISIT (OUTPATIENT)
Dept: FAMILY MEDICINE | Facility: CLINIC | Age: 59
End: 2019-08-12
Payer: COMMERCIAL

## 2019-08-12 VITALS
OXYGEN SATURATION: 97 % | TEMPERATURE: 98.3 F | DIASTOLIC BLOOD PRESSURE: 66 MMHG | HEART RATE: 82 BPM | SYSTOLIC BLOOD PRESSURE: 130 MMHG

## 2019-08-12 DIAGNOSIS — Z12.39 BREAST CANCER SCREENING: ICD-10-CM

## 2019-08-12 DIAGNOSIS — Z23 NEED FOR TDAP VACCINATION: ICD-10-CM

## 2019-08-12 DIAGNOSIS — Z12.11 COLON CANCER SCREENING: ICD-10-CM

## 2019-08-12 DIAGNOSIS — M17.9 OSTEOARTHRITIS OF KNEE, UNSPECIFIED LATERALITY, UNSPECIFIED OSTEOARTHRITIS TYPE: ICD-10-CM

## 2019-08-12 DIAGNOSIS — Z00.00 ENCOUNTER FOR GENERAL ADULT MEDICAL EXAMINATION WITHOUT ABNORMAL FINDINGS: Primary | ICD-10-CM

## 2019-08-12 DIAGNOSIS — I10 ESSENTIAL HYPERTENSION: ICD-10-CM

## 2019-08-12 DIAGNOSIS — R01.1 CARDIAC MURMUR: ICD-10-CM

## 2019-08-12 PROCEDURE — 90471 IMMUNIZATION ADMIN: CPT | Performed by: FAMILY MEDICINE

## 2019-08-12 PROCEDURE — 99396 PREV VISIT EST AGE 40-64: CPT | Mod: 25 | Performed by: FAMILY MEDICINE

## 2019-08-12 PROCEDURE — 90715 TDAP VACCINE 7 YRS/> IM: CPT | Performed by: FAMILY MEDICINE

## 2019-08-12 ASSESSMENT — ENCOUNTER SYMPTOMS
JOINT SWELLING: 0
UNEXPECTED WEIGHT CHANGE: 1
PALPITATIONS: 0
ABDOMINAL PAIN: 0
FREQUENCY: 0
SHORTNESS OF BREATH: 0
BRUISES/BLEEDS EASILY: 0
ARTHRALGIAS: 1
WEAKNESS: 0
DYSPHORIC MOOD: 0

## 2019-08-12 NOTE — ASSESSMENT & PLAN NOTE
Suggest echo and f/u with Dr Phillips.  She believes her last echocardiogram was about 8 years ago and does not recall cause for murmur. Prior records not available.

## 2019-08-12 NOTE — PROGRESS NOTES
Chief Complaint  Chief Complaint   Patient presents with   • Annual Exam     over due for colonoscopy and mammogram        History of Present Illness  Here for PE.  Has been having a lot of left knee pain and has finally scheduled surgery for knee replacement with Dr Bird at the end of Sept in [place] at their surgery center.   We had discussed she should have labs and colonoscopy and HM prior to knee surgery and she never scheduled that or did labs.   Last Tdap many yrs ago.   Last gyn 3 yrs ago and normal.   Has had limited exercise due to her knee pain.  Has had a problem with gaining weight.  Feels her diet could be much better and she plans to work on this after her knee surgery.  BP has been controlled on her current medication.          Current Outpatient Prescriptions   Medication Sig Dispense Refill   • lisinopril (PRINIVIL) 10 mg tablet Take 1 tablet (10 mg total) by mouth daily. 90 tablet 2     No current facility-administered medications for this visit.        Patient Active Problem List   Diagnosis   • Essential hypertension   • Cardiac murmur   • Osteoarthritis of knee   • Anxiety   • Obesity due to excess calories       Past Medical History:   Diagnosis Date   • Anxiety 1/17/2019   • Cardiac murmur 1/17/2019   • Essential hypertension 1/17/2019   • Hypertension    • Obesity due to excess calories 1/17/2019   • Osteoarthritis of knee 1/17/2019       Past Surgical History:   Procedure Laterality Date   • HERNIA REPAIR     • MOUTH SURGERY         Family History   Problem Relation Age of Onset   • Hypertension Mother    • Thyroid disease Sister    • Hypertension Brother    • Thyroid disease Brother    • Hypertension Maternal Grandfather    • Breast cancer Mother's Sister    • Testicular cancer Cousin    • Colon cancer Father        Social History     Social History   • Marital status:      Spouse name: N/A   • Number of children: N/A   • Years of education: N/A     Occupational History   • Not on  file.     Social History Main Topics   • Smoking status: Never Smoker   • Smokeless tobacco: Never Used   • Alcohol use Yes      Comment: occass   • Drug use: No   • Sexual activity: Not on file     Other Topics Concern   • Not on file     Social History Narrative    [employer]        Allergies   Allergen Reactions   • Penicillins      Yeast infection       Review of Systems   Constitutional: Positive for unexpected weight change (weight gain).   HENT: Negative for ear pain.    Eyes: Negative for visual disturbance.   Respiratory: Negative for shortness of breath.    Cardiovascular: Negative for chest pain and palpitations.   Gastrointestinal: Negative for abdominal pain.   Endocrine: Negative for polyuria.   Genitourinary: Negative for frequency.   Musculoskeletal: Positive for arthralgias (knee pain). Negative for joint swelling.   Skin: Negative for rash.   Allergic/Immunologic: Negative for immunocompromised state.   Neurological: Negative for weakness.   Hematological: Does not bruise/bleed easily.   Psychiatric/Behavioral: Negative for dysphoric mood.       Vitals:    08/12/19 1706   BP: 130/66   BP Location: Right upper arm   Patient Position: Sitting   Pulse: 82   Temp: 36.8 °C (98.3 °F)   TempSrc: Oral   SpO2: 97%     There is no height or weight on file to calculate BMI.    Physical Exam   Constitutional: She is oriented to person, place, and time. She appears well-developed and well-nourished.   HENT:   Right Ear: External ear normal.   Left Ear: External ear normal.   Nose: Nose normal.   Mouth/Throat: Oropharynx is clear and moist.   Eyes: Conjunctivae are normal.   Cardiovascular: Normal rate, regular rhythm and intact distal pulses.    Murmur heard.  Pulmonary/Chest: Effort normal and breath sounds normal. She has no wheezes. She has no rales.   Abdominal: Soft. There is no tenderness.   Musculoskeletal: She exhibits no edema.   Lymphadenopathy:     She has no cervical adenopathy.   Neurological:  She is alert and oriented to person, place, and time. Coordination normal.   Skin: No rash noted. No erythema.   Psychiatric: She has a normal mood and affect. Her behavior is normal.   Nursing note and vitals reviewed.      Problem List Items Addressed This Visit     Essential hypertension     Currently well controlled, continue the same medication.         Cardiac murmur     Suggest echo and f/u with Dr Phillips.  She believes her last echocardiogram was about 8 years ago and does not recall cause for murmur. Prior records not available.         Relevant Orders    Transthoracic echo (TTE) complete    Osteoarthritis of knee     Has knee replacement surgery mita Sept           Other Visit Diagnoses     Encounter for general adult medical examination without abnormal findings    -  Primary    Encouraged following through with yearly physical, health maintenance examinations, compliance with medical advice.    Need for Tdap vaccination        Relevant Orders    Tdap vaccine greater than or equal to 8yo IM (Completed)    Breast cancer screening        Relevant Orders    BI SCREENING MAMMOGRAM BILATERAL    Colon cancer screening        Relevant Orders    Direct Access Colonoscopy BMMSA        Encouraged her to try to have old records forwarded.  Return in about 6 months (around 2/12/2020) for Next scheduled follow-up BP ck.          Sue Thakkar MD  [facility]  Family Medicine in [place]  [address] [place] [address],  [address]  [place]  [address]  P: [phone]  F: [phone]

## 2019-08-14 ENCOUNTER — TELEPHONE (OUTPATIENT)
Dept: FAMILY MEDICINE | Facility: CLINIC | Age: 59
End: 2019-08-14

## 2019-08-14 NOTE — TELEPHONE ENCOUNTER
Pt has Cardiology appt with CCP in [facility] on 8/28/19. If patients insurance requires a referral please submit it. NPI number is [id number]  TY

## 2019-08-14 NOTE — TELEPHONE ENCOUNTER
Referral submitted in [facility]  NPI: [id number]  [address] [facility] [address], [place] [address]  Referral #: [id number] Effective: 08/14/2019 Expires: 11/11/2019   I10 - Essential (primary) hypertension

## 2019-08-22 ENCOUNTER — HOSPITAL ENCOUNTER (OUTPATIENT)
Dept: CARDIOLOGY | Age: 59
Discharge: HOME | End: 2019-08-22
Attending: FAMILY MEDICINE
Payer: COMMERCIAL

## 2019-08-22 VITALS
WEIGHT: 196 LBS | HEIGHT: 62 IN | DIASTOLIC BLOOD PRESSURE: 66 MMHG | BODY MASS INDEX: 36.07 KG/M2 | SYSTOLIC BLOOD PRESSURE: 130 MMHG

## 2019-08-22 DIAGNOSIS — R01.1 CARDIAC MURMUR: ICD-10-CM

## 2019-08-22 LAB
AORTIC ROOT ANNULUS: 2.6 CM
AORTIC VALVE MEAN VELOCITY: 1.93 M/S
AORTIC VALVE VELOCITY TIME INTEGRAL: 55.5 CM
ASCENDING AORTA: 2.8 CM
AV MEAN GRADIENT: 20 MMHG
AV PEAK GRADIENT: 45 MMHG
AV PEAK VELOCITY-S: 3.34 M/S
BSA FOR ECHO PROCEDURE: 1.97 M2
DOP CALC LVOT STROKE VOLUME: 106.13 ML
E WAVE DECELERATION TIME: 253 MS
E/A RATIO: 0.7
E/E' RATIO: 10.2
FRACTIONAL SHORTENING: 43.3 %
INTERVENTRICULAR SEPTUM: 0.89 CM
LA/AORTA RATIO: 1.35
LAAS-AP4: 15.3 CM2
LAD 2D: 3.5 CM
LALD A4C: 5.14 CM
LEFT ATRIUM VOLUME INDEX: 19.29 CM3/M2
LEFT ATRIUM VOLUME: 38 CM3
LEFT INTERNAL DIMENSION IN SYSTOLE: 2.55 CM (ref 2.66–4.03)
LEFT VENTRICULAR INTERNAL DIMENSION IN DIASTOLE: 4.5 CM (ref 4.51–6.26)
LEFT VENTRICULAR POSTERIOR WALL IN END DIASTOLE: 1.05 CM (ref 0.59–1.1)
LVOT 2D: 2 CM
LVOT A: 3.14 CM2
LVOT MG: 9 MMHG
LVOT MV: 1.29 M/S
LVOT PEAK VELOCITY: 2.02 M/S
LVOT PG: 16 MMHG
LVOT VTI: 33.8 CM
MV E'TISSUE VEL-MED: 0.08 M/S
MV PEAK A VEL: 1.14 M/S
MV PEAK E VEL: 0.83 M/S
POSTERIOR WALL: 1.05 CM
Z-SCORE OF LEFT VENTRICULAR DIMENSION IN END DIASTOLE: -1.66
Z-SCORE OF LEFT VENTRICULAR DIMENSION IN END SYSTOLE: -1.97
Z-SCORE OF LEFT VENTRICULAR POSTERIOR WALL IN END DIASTOLE: 1.39

## 2019-08-22 PROCEDURE — 93306 TTE W/DOPPLER COMPLETE: CPT

## 2019-08-24 LAB
CHOLEST SERPL-MCNC: 181 MG/DL (ref 100–199)
HDLC SERPL-MCNC: 60 MG/DL
LDLC SERPL CALC-MCNC: 105 MG/DL (ref 0–99)
LDLC/HDLC SERPL: 1.8 RATIO (ref 0–3.2)
TRIGL SERPL-MCNC: 81 MG/DL (ref 0–149)
VLDLC SERPL CALC-MCNC: 16 MG/DL (ref 5–40)

## 2019-08-29 ENCOUNTER — TELEPHONE (OUTPATIENT)
Dept: FAMILY MEDICINE | Facility: CLINIC | Age: 59
End: 2019-08-29

## 2019-08-29 NOTE — TELEPHONE ENCOUNTER
Please advise patient that her cholesterol profile looks pretty good with a good HDL cholesterol.  The echocardiogram showed a little bit of a gradient which means that there is a little pressure difference when she bears down.  It was not high enough to be concerning but it is something I would like her to discuss with Dr. Phillips and they may want to watch over time.  Please make sure she has scheduled appointment with her cardiologist, Dr. Phillips, and he is at [facility] and we can forward a copy of her echo to him.

## 2019-08-30 NOTE — TELEPHONE ENCOUNTER
Spoke to patient and reviewed lab and echo results. She will book an appt to see Dr. Phillips, results faxed to his offices. Results also faxed to Dr. Bird, orthopedic surgeon per pt request.

## 2019-09-24 ENCOUNTER — PATIENT OUTREACH (OUTPATIENT)
Dept: FAMILY MEDICINE | Facility: CLINIC | Age: 59
End: 2019-09-24

## 2019-09-24 NOTE — PROGRESS NOTES
Care Gap Team has outreached to Leny Burton on behalf of their primary care provider.      Care Gap Source:: IPPIP Gap Report         Care Gap Status:: Due    Outreach via:: Telephone    Adult Preventive Wellness Protocol(s) Used: : Breast Cancer Screening    Chart Review Completed:: Yes  Patient interview completed:: No  Inclusion Criteria:: Met  Exclusion Criteria: None  Patient educated on recommended care:: No       Provided order(s) for:: none    Provided clinical referral(s) for:: None    Appointment provided:: No                I left a message requesting that patient return my call at her earliest convenience. Per the IPPIP Care Gap Report, patient is due for a mammogram screening.    Mammo was ordered on 8/12/19.    **Patient returned my call regarding past due mammogram screening. Patient states that she recently had a knee replacement, so she will not be getting screening done just yet. Patient was very appreciative of the reminder call. **

## 2019-10-17 ENCOUNTER — APPOINTMENT (EMERGENCY)
Dept: RADIOLOGY | Facility: HOSPITAL | Age: 59
End: 2019-10-17
Attending: EMERGENCY MEDICINE
Payer: COMMERCIAL

## 2019-10-17 ENCOUNTER — HOSPITAL ENCOUNTER (EMERGENCY)
Facility: HOSPITAL | Age: 59
Discharge: HOME | End: 2019-10-17
Attending: EMERGENCY MEDICINE
Payer: COMMERCIAL

## 2019-10-17 VITALS
BODY MASS INDEX: 31.89 KG/M2 | RESPIRATION RATE: 20 BRPM | OXYGEN SATURATION: 98 % | HEART RATE: 86 BPM | SYSTOLIC BLOOD PRESSURE: 145 MMHG | TEMPERATURE: 99.1 F | WEIGHT: 180 LBS | HEIGHT: 63 IN | DIASTOLIC BLOOD PRESSURE: 56 MMHG

## 2019-10-17 DIAGNOSIS — S90.32XA CONTUSION OF LEFT FOOT, INITIAL ENCOUNTER: Primary | ICD-10-CM

## 2019-10-17 PROCEDURE — 73600 X-RAY EXAM OF ANKLE: CPT | Mod: LT

## 2019-10-17 PROCEDURE — 99283 EMERGENCY DEPT VISIT LOW MDM: CPT | Mod: 25

## 2019-10-17 PROCEDURE — 73630 X-RAY EXAM OF FOOT: CPT | Mod: LT

## 2019-10-17 RX ORDER — CELECOXIB 200 MG/1
200 CAPSULE ORAL
Refills: 0 | COMMUNITY
Start: 2019-09-21 | End: 2021-03-30

## 2019-10-17 RX ORDER — ASPIRIN 81 MG/1
81 TABLET ORAL 2 TIMES DAILY
COMMUNITY
End: 2021-03-30

## 2019-10-17 ASSESSMENT — ENCOUNTER SYMPTOMS
FEVER: 0
SHORTNESS OF BREATH: 0
NUMBNESS: 0

## 2019-10-17 NOTE — DISCHARGE INSTRUCTIONS
Apply ice to area  Keep elevated  Use ACE wrap and stiff soled shoe as needed  Return for fever or any other concerning symptoms
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
## 2019-11-07 ENCOUNTER — CLINICAL SUPPORT (OUTPATIENT)
Dept: FAMILY MEDICINE | Facility: CLINIC | Age: 59
End: 2019-11-07
Payer: COMMERCIAL

## 2019-11-07 DIAGNOSIS — Z23 NEED FOR IMMUNIZATION AGAINST INFLUENZA: Primary | ICD-10-CM

## 2019-11-07 PROCEDURE — 90471 IMMUNIZATION ADMIN: CPT | Performed by: FAMILY MEDICINE

## 2019-11-07 PROCEDURE — 90686 IIV4 VACC NO PRSV 0.5 ML IM: CPT | Performed by: FAMILY MEDICINE

## 2020-02-27 ENCOUNTER — PATIENT OUTREACH (OUTPATIENT)
Dept: FAMILY MEDICINE | Facility: CLINIC | Age: 60
End: 2020-02-27

## 2020-02-27 NOTE — PROGRESS NOTES
Care Gap Team has outreached to Leny Burton on behalf of their primary care provider.      Care Gap Source:: [facility]         Care Gap Status:: Due    Outreach via:: Telephone    Adult Preventive Wellness Protocol(s) Used: : Colorectal Cancer Screening, Breast Cancer Screening, Cervical Cancer Screening    Chart Review Completed:: Yes  Patient interview completed:: No  Inclusion Criteria:: Met  Exclusion Criteria: None  Patient educated on recommended care:: No       Provided order(s) for:: none    Provided clinical referral(s) for:: None    Appointment provided:: No            Per the [facility] Care Gap Report, patient is due for a colorectal cancer screening, mammogram screening, and a cervical cancer screening. I left a message requesting that patient return my call at her earliest convenience to further discuss.

## 2020-03-02 ENCOUNTER — APPOINTMENT (EMERGENCY)
Dept: RADIOLOGY | Facility: HOSPITAL | Age: 60
End: 2020-03-02
Attending: EMERGENCY MEDICINE
Payer: COMMERCIAL

## 2020-03-02 ENCOUNTER — HOSPITAL ENCOUNTER (EMERGENCY)
Facility: HOSPITAL | Age: 60
Discharge: HOME | End: 2020-03-02
Attending: EMERGENCY MEDICINE
Payer: COMMERCIAL

## 2020-03-02 VITALS
SYSTOLIC BLOOD PRESSURE: 131 MMHG | WEIGHT: 200 LBS | BODY MASS INDEX: 36.8 KG/M2 | RESPIRATION RATE: 18 BRPM | OXYGEN SATURATION: 98 % | HEIGHT: 62 IN | HEART RATE: 86 BPM | DIASTOLIC BLOOD PRESSURE: 63 MMHG | TEMPERATURE: 99 F
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
## 2020-03-02 DIAGNOSIS — S06.0X0A CONCUSSION WITHOUT LOSS OF CONSCIOUSNESS, INITIAL ENCOUNTER: ICD-10-CM

## 2020-03-02 DIAGNOSIS — M25.562 ACUTE PAIN OF LEFT KNEE: ICD-10-CM

## 2020-03-02 PROCEDURE — 70450 CT HEAD/BRAIN W/O DYE: CPT

## 2020-03-02 PROCEDURE — 73560 X-RAY EXAM OF KNEE 1 OR 2: CPT | Mod: LT

## 2020-03-02 PROCEDURE — 99284 EMERGENCY DEPT VISIT MOD MDM: CPT | Mod: 25

## 2020-03-02 ASSESSMENT — ENCOUNTER SYMPTOMS
SHORTNESS OF BREATH: 0
ACTIVITY CHANGE: 0
WOUND: 0
ABDOMINAL PAIN: 0
DIZZINESS: 0
NECK PAIN: 1
NUMBNESS: 0
LIGHT-HEADEDNESS: 0
BRUISES/BLEEDS EASILY: 0
CONFUSION: 0
HEADACHES: 1
VOMITING: 0
FEVER: 0
BACK PAIN: 0
APPETITE CHANGE: 0
NAUSEA: 1

## 2020-03-03 ENCOUNTER — TELEPHONE (OUTPATIENT)
Dept: FAMILY MEDICINE | Facility: CLINIC | Age: 60
End: 2020-03-03

## 2020-03-03 NOTE — TELEPHONE ENCOUNTER
I called Leny to check on her following her ED visit to [facility]. I got a answering machine,but Leny doesn't have a up to date [employer] form,so I left a message asking for a call back.

## 2020-03-03 NOTE — DISCHARGE INSTRUCTIONS
With a diagnosis of concussion, we recommend that you rest for the next 5 days.  Brain rest involves limiting your exposure to screens such as tablets, computers and television.  We also recommend you follow-up with her concussion clinic for rehab.    Take Tylenol and/or ibuprofen as needed for headache.    Return to the emergency room if you experience worsening symptoms including unusual confusion or lethargy, persistent vomiting, severe headache, dizziness, unsteady gait, changes in pupil size or any other concerning symptoms.

## 2020-03-03 NOTE — ED PROVIDER NOTES
HPI     Chief Complaint   Patient presents with   • Fall       59-year-old female history of anxiety, hypertension presents for evaluation after fall.  Patient was with her 15-year-old granddaughter yesterday at the park, states she fell backwards, striking her occiput against a hard ground.  She is unsure whether she lost consciousness, states it will happen very quickly and is not even exactly sure how she fell.  She reports feeling slightly nauseated yesterday, with an intermittent bandlike headache around her entire head.  She took 400 mg ibuprofen today with improvement of headache.  She also notes very slight pain in the left knee without difficulty ambulating.  She became concerned because she had that knee replaced previously.      History provided by:  Patient and spouse       Patient History     Past Medical History:   Diagnosis Date   • Anxiety 1/17/2019   • Cardiac murmur 1/17/2019   • Essential hypertension 1/17/2019   • Hypertension    • Obesity due to excess calories 1/17/2019   • Osteoarthritis of knee 1/17/2019       Past Surgical History:   Procedure Laterality Date   • HERNIA REPAIR     • MOUTH SURGERY     • REPLACEMENT TOTAL KNEE Left        Family History   Problem Relation Age of Onset   • Hypertension Biological Mother    • Thyroid disease Biological Sister    • Hypertension Biological Brother    • Thyroid disease Biological Brother    • Hypertension Maternal Grandfather    • Breast cancer Mother's Sister    • Testicular cancer Cousin    • Colon cancer Biological Father        Social History     Tobacco Use   • Smoking status: Never Smoker   • Smokeless tobacco: Never Used   Substance Use Topics   • Alcohol use: Yes     Comment: occass   • Drug use: No       Systems Reviewed from Nursing Triage:          Review of Systems     Review of Systems   Constitutional: Negative for activity change, appetite change and fever.   HENT: Negative for nosebleeds.    Eyes: Negative for visual disturbance.  "  Respiratory: Negative for shortness of breath.    Cardiovascular: Negative for chest pain.   Gastrointestinal: Positive for nausea. Negative for abdominal pain and vomiting.   Musculoskeletal: Positive for neck pain. Negative for back pain.   Skin: Negative for wound.   Neurological: Positive for headaches. Negative for dizziness, syncope, light-headedness and numbness.   Hematological: Does not bruise/bleed easily.   Psychiatric/Behavioral: Negative for confusion.   All other systems reviewed and are negative.       Physical Exam     ED Triage Vitals [03/02/20 1910]   Temp Heart Rate Resp BP SpO2   37.2 °C (99 °F) 82 16 (!) 141/74 98 %      Temp Source Heart Rate Source Patient Position BP Location FiO2 (%) (Set)   Tympanic -- Sitting Left upper arm --       Pulse Ox %: 98 % (03/02/20 2110)             Patient Vitals for the past 24 hrs:   BP Temp Temp src Pulse Resp SpO2 Height Weight   03/02/20 2109 131/63 -- -- 86 18 -- -- --   03/02/20 1910 (!) 141/74 37.2 °C (99 °F) Tympanic 82 16 98 % 1.575 m (5' 2\") 90.7 kg (200 lb)                                       Physical Exam   Constitutional: She is oriented to person, place, and time. She appears well-developed and well-nourished.   HENT:   Head: Normocephalic and atraumatic.   Right Ear: External ear normal.   Left Ear: External ear normal.   Nose: Nose normal.   Mouth/Throat: Oropharynx is clear and moist.   Eyes: Pupils are equal, round, and reactive to light. Conjunctivae and EOM are normal.   Neck: Normal range of motion.   Cardiovascular: Normal rate, regular rhythm, normal heart sounds and intact distal pulses.   Pulmonary/Chest: Effort normal and breath sounds normal.   Abdominal: Soft. Bowel sounds are normal. There is no tenderness. There is no guarding.   Musculoskeletal: Normal range of motion.   Neurological: She is alert and oriented to person, place, and time. No cranial nerve deficit. She exhibits normal muscle tone.   Skin: Skin is warm and dry. "   Nursing note and vitals reviewed.           Procedures    ED Course & MDM     Labs Reviewed - No data to display    X-RAY KNEE LEFT 1 OR 2 VIEWS   ED Interpretation   No acute fx            CT HEAD WITHOUT IV CONTRAST   Final Result   IMPRESSION:   No evidence of hemorrhage, mass or acute territorial infarction by CT criteria.         COMMENT:      Technique: Computed tomography of the brain was performed utilizing contiguous   2.5 mm transaxial sections without intravenous contrast administration.      CT DOSE:  One or more dose reduction techniques (e.g. automated exposure   control, adjustment of the mA and/or kV according to patient size, use of   iterative reconstruction technique) utilized for this examination.      Comparison studies: None.      Postsurgical change: None.      Brain parenchyma: There is no intracranial hemorrhage, mass effect, midline   shift, or extra-axial fluid collection.  No acute infarct.   White matter changes: Normal for age   Ventricles, cisterns, and sulci: Normal in size and configuration.   Sella and cerebellar tonsils: Normal in appearance.   Vasculature: Carotid artery calcifications.      Calvarium and extra cranial soft tissues: Normal.   Paranasal sinuses: Opacification of a right ethmoid air cell is present.   Mastoid air cells: Normal   Orbits: Normal         I certify that I have personally reviewed this study and agree with this report.   Lora Ryder MD                     [facility]         ED Course as of Mar 02 2129   Mon Mar 02, 2020   2129 Dx: concussion  Plan: brain rest, pain management, f/u with concussion clinic    [EK]      ED Course User Index  [EK] Maegan Barreto PA C         Clinical Impressions as of Mar 02 2129   Fall, initial encounter   Acute pain of left knee   Concussion without loss of consciousness, initial encounter     Discharged     Maegan Barreto PA C  03/02/20 2130
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
## 2020-06-16 ENCOUNTER — PATIENT OUTREACH (OUTPATIENT)
Dept: FAMILY MEDICINE | Facility: CLINIC | Age: 60
End: 2020-06-16

## 2020-06-16 NOTE — PROGRESS NOTES
Care Gap Team has outreached to Leny Burton on behalf of their primary care provider.      Care Gap Source:: [facility]         Care Gap Status:: Due    Outreach via:: Telephone    Adult Preventive Wellness Protocol(s) Used: : Breast Cancer Screening    Chart Review Completed:: Yes  Patient interview completed:: No  Inclusion Criteria:: Met  Exclusion Criteria: None  Patient educated on recommended care:: No       Provided order(s) for:: none    Provided clinical referral(s) for:: None    Appointment provided:: No     Per the [facility] Care Gap Report, patient is due for a mammogram screening. I left a generic message requesting that patient return my call at her earliest convenience.

## 2020-07-10 ENCOUNTER — PATIENT OUTREACH (OUTPATIENT)
Dept: FAMILY MEDICINE | Facility: CLINIC | Age: 60
End: 2020-07-10

## 2020-07-10 NOTE — PROGRESS NOTES
Care Gap Team has outreached to Leny Burton on behalf of their primary care provider.      Care Gap Source:: [facility]         Care Gap Status:: Due    Outreach via:: Telephone    Adult Preventive Wellness Protocol(s) Used: : Colorectal Cancer Screening, Cervical Cancer Screening, Breast Cancer Screening    Chart Review Completed:: Yes  Patient interview completed:: No  Inclusion Criteria:: Met  Exclusion Criteria: None  Patient educated on recommended care:: No       Provided order(s) for:: none    Provided clinical referral(s) for:: None    Appointment provided:: No     Per the [facility] Care Gap Report, patient is due for multiple preventative healthcare screenings. I left a message requesting that patient return my call at her earliest convenience to further discuss.

## 2020-07-17 ENCOUNTER — TELEPHONE (OUTPATIENT)
Dept: FAMILY MEDICINE | Facility: CLINIC | Age: 60
End: 2020-07-17

## 2020-09-04 ENCOUNTER — TELEPHONE (OUTPATIENT)
Dept: FAMILY MEDICINE | Facility: CLINIC | Age: 60
End: 2020-09-04

## 2020-09-04 ENCOUNTER — CONSULT (OUTPATIENT)
Dept: FAMILY MEDICINE | Facility: CLINIC | Age: 60
End: 2020-09-04
Payer: COMMERCIAL

## 2020-09-04 VITALS
RESPIRATION RATE: 18 BRPM | OXYGEN SATURATION: 98 % | TEMPERATURE: 97.3 F | BODY MASS INDEX: 36.44 KG/M2 | HEIGHT: 61 IN | DIASTOLIC BLOOD PRESSURE: 82 MMHG | SYSTOLIC BLOOD PRESSURE: 120 MMHG | HEART RATE: 78 BPM | WEIGHT: 193 LBS

## 2020-09-04 DIAGNOSIS — Z12.39 SCREENING FOR BREAST CANCER: Primary | ICD-10-CM

## 2020-09-04 ASSESSMENT — PAIN SCALES - GENERAL: PAINLEVEL: 0-NO PAIN

## 2020-09-04 NOTE — PROGRESS NOTES
[facility]  [address] [place] [address]  [phone]       No chief complaint on file.    ?  Chief Complaint / History of present illness: Leny Burton is a 59 y.o. female who presents at the request of  for a pre-operative evaluation and cardiopulmonary clearance prior to *** surgery/procedure scheduled for ***. Indication for surgery is ***    Patient is independent***, can walk multiple flights of stairs without distress and multiple blocks without distress without anxiety.      ?  PMHX:  Patient Active Problem List   Diagnosis   • Essential hypertension   • Cardiac murmur   • Osteoarthritis of knee   • Anxiety   • Obesity due to excess calories     ?  Past Surgical History:   Procedure Laterality Date   • HERNIA REPAIR     • MOUTH SURGERY     • REPLACEMENT TOTAL KNEE Left      ?    Current Outpatient Medications:   •  aspirin 81 mg enteric coated tablet, Take 81 mg by mouth 2 (two) times a day., Disp: , Rfl:   •  celecoxib (celeBREX) 200 mg capsule, Take 200 mg by mouth once daily., Disp: , Rfl: 0  •  lisinopril (PRINIVIL) 10 mg tablet, TAKE 1 TABLET BY MOUTH EVERY DAY, Disp: 90 tablet, Rfl: 3    Allergies:   Allergies   Allergen Reactions   • Penicillins      Yeast infection     ?  Famhx:  family history includes Breast cancer in her mother's sister; Colon cancer in her biological father; Hypertension in her biological brother, biological mother, and maternal grandfather; Testicular cancer in her cousin; Thyroid disease in her biological brother and biological sister.  ?  SocHx:  Social History     Tobacco Use   • Smoking status: Never Smoker   • Smokeless tobacco: Never Used   Substance Use Topics   • Alcohol use: Yes     Comment: occass   • Drug use: No     ?  Review of Systems   Review of Systems  ?  Immunization History   Administered Date(s) Administered   • Influenza Vaccine Quadrivalent Preservative Free 6 Mons and Up 11/07/2019   • Tdap 08/12/2019        Anesthesia History:   She {HAS/ NOT:9025} had an adverse reaction to anesthesia in the past. Family members who have had adverse reactions to anesthesia include: {FAMILY MEMBERS:20}.   Steriod History:   She {HAS/HAS NOT:9025} used steriods in the past 6 months.   Recent infection exposure history:   She {HAS/HAS NOT:9025} been exposed to a sick person recently.   ?  Has the patient or a family member ever had a problem with anesthesia? {YES/NO:63}  Any patient or family history of bleeding disorder? {YES/NO:63}    Any history of asthma or pulmonary disorders or cardiac issues? {YES/NO:63}    Most recent activity includes:    Last surgery or procedure with sedation:   ?  PE:  There were no vitals filed for this visit.  General: A&O x 3, NAD  HEENT: PERRL, EOMI, normocephalic, neck nontender/supple, MMM, TM's clear, no cervical lymphadenopathy, normal oropharynx with normal tonsils, nasal turbs non-boggy, normal thyroid per palpation  CV: RRR, - m/r/g, - edema, + pulses in all extremities  Resp: CTAB, BS equal, symmetrical expansion  GI: soft, NT/ND, + BS, - guarding/rebound  MSK: normal ROM/strength, - swelling/deformity, normal gait  Skin: warm, dry, no lesions  Neuro: No focal neuro deficits noted, CNII-XII intact  Psyc: normal affect, normal language, normal cognition  ?  ?  EKG: Reviewed - ***  Imaging: Reviewed ***?  Labs: Reviewed ***- acceptable without concerns.    Echo 8/2019 normal function normal valves   ?  Assessment and Plan:  Leny Burton is a 59 y.o. female presenting today for No chief complaint on file.  .  No diagnosis found.    Patient is ASA class ***. Wilfrido Perioperative Cardiac Risk Estimated risk of perioperative myocardial infarction or cardiac arrest: *** (LOW risk)  Patient is a low preoperative cardiopulmonary risk for elective surgery. Plan is to proceed per surgical recommendations without further testing. Pt has been instructed medications to avoid for 7-10 days preop including  NSAIDs and supplements.  Patient may take their routine medications with a small sip of water prior to surgery.      Based on the consultation as outlined above I belive the patient to be medically stable for this procedure. If General anesthesia was needed - the patient should tolerate if from a cardiac and pulmonary perspective without difficulty.      A copy of this note will be sent to Dr. Bird who requested this consultation.  ?  -routine f/u with PCP for chronic medical conditions

## 2020-09-04 NOTE — TELEPHONE ENCOUNTER
Patient checked in for her appointment then did not end up needing it (was scheduled for a pre-op then really only needed some paperwork per provider).    Patient would like co-pay credited to her account.    dk

## 2020-10-19 ENCOUNTER — TELEPHONE (OUTPATIENT)
Dept: FAMILY MEDICINE | Facility: CLINIC | Age: 60
End: 2020-10-19

## 2020-10-19 NOTE — TELEPHONE ENCOUNTER
Referral Details   JHONATAN HAYES   Female born on 1960 (59 yrs old)   New/Copy Referral Search View/Print as PDF    Valid  Referral #: [id number]  Effective: 10/19/2020  Expires: 01/16/2021    Referral [id number] has been successfully submitted.   JHONATAN HAYES   [address]   [facility] [place], [place] [id number]   PATIENT'S INSURANCE  Member ID: [id number]  PRIMARY CARE PHYSICIAN  [facility]   NPI: [id number]   From  [facility]   NPI: [id number]  [address] [place] [address]   To  [facility]  NPI: [id number]  MEDICAL [facility], [address] [place] [address], [place]   ,   Service Type: Medical Care (Consult and Treat)   Place of Service: Office   Note to Patient   This referral is valid at any location for the above group.     Clinical Information    Diagnoses (1)     Diagnosis    1  M79.609 - Pain in unspecified limb      Procedures (1)     Procedure    1  79629    Disclaimer:   [employer] and its Affiliates (IB) will pay for only those services covered under the [facility] Contract which are specifically noted and requested by the PCP or OBGYN on the referral. If any additional services, testing, or follow-up care are required, the PCP or OBGYN must be contacted prior to the delivery of such additional services for written approval on a separate referral. Non-referred services will not be covered by [facility]. Benefits are underwritten or administered by [employer], a subsidiary of [employer], which are independent licensees of the Blue Cross and Blue Shield Association.   Copyright © 2020 [employer] All rights reserved. [employer]® is a registered trademark of [employer]. and/or its affiliates

## 2020-11-04 ENCOUNTER — PATIENT OUTREACH (OUTPATIENT)
Dept: FAMILY MEDICINE | Facility: CLINIC | Age: 60
End: 2020-11-04

## 2020-11-04 NOTE — PROGRESS NOTES
Care Gap Team has outreached to Leny SMITH Hermescorina on behalf of their primary care provider.      Care Gap Source:: [facility] - [facility]         Care Gap Status:: Due    Outreach via:: Telephone    Adult Preventive Wellness Protocol(s) Used: : Breast Cancer Screening    Chart Review Completed:: Yes  Patient interview completed:: No  Inclusion Criteria:: Met  Exclusion Criteria: None  Patient educated on recommended care:: No                 Appointment provided:: No          Called and left VM for patient letting them know that they are due for their cervical cancer screening and breast cancer screening.  Asked patient to return my call.

## 2020-11-25 ENCOUNTER — PATIENT OUTREACH (OUTPATIENT)
Dept: FAMILY MEDICINE | Facility: CLINIC | Age: 60
End: 2020-11-25

## 2020-11-25 NOTE — PROGRESS NOTES
Care Gap Team has outreached to Leny SMITH Micheal on behalf of their primary care provider.      Care Gap Source:: [facility] - QI         Care Gap Status:: Due    Outreach via:: [employer] Portal    Adult Preventive Wellness Protocol(s) Used: : Breast Cancer Screening, Cervical Cancer Screening    Chart Review Completed:: Yes  Patient interview completed:: No  Inclusion Criteria:: Met  Exclusion Criteria: None  Patient educated on recommended care:: No                 Appointment provided:: No                Outreach via [employer]

## 2020-12-09 ENCOUNTER — DOCUMENTATION (OUTPATIENT)
Dept: FAMILY MEDICINE | Facility: CLINIC | Age: 60
End: 2020-12-09

## 2020-12-09 NOTE — PROGRESS NOTES
Care Gap Team has outreached to Leny SMITH Hermescorina on behalf of their primary care provider.      Care Gap Source: [facility]    Care Gap(s) Identified: Breast Cancer Screening                   Chart Review Completed: Yes         Patient is showing as being due for a breast cancer screening. I have reviewed the patient's chart, and noticed that the patient was recently outreached on 11/25/2020, by another POC in regards to her preventative screenings. No outreach is needed at this time.

## 2021-03-30 ENCOUNTER — OFFICE VISIT (OUTPATIENT)
Dept: FAMILY MEDICINE | Facility: CLINIC | Age: 61
End: 2021-03-30
Payer: COMMERCIAL

## 2021-03-30 VITALS
WEIGHT: 191 LBS | RESPIRATION RATE: 16 BRPM | TEMPERATURE: 97.8 F | OXYGEN SATURATION: 97 % | DIASTOLIC BLOOD PRESSURE: 60 MMHG | HEART RATE: 82 BPM | SYSTOLIC BLOOD PRESSURE: 100 MMHG | HEIGHT: 63 IN | BODY MASS INDEX: 33.84 KG/M2

## 2021-03-30 DIAGNOSIS — I10 ESSENTIAL HYPERTENSION: ICD-10-CM

## 2021-03-30 DIAGNOSIS — Z00.00 ENCOUNTER FOR GENERAL ADULT MEDICAL EXAMINATION WITHOUT ABNORMAL FINDINGS: Primary | ICD-10-CM

## 2021-03-30 DIAGNOSIS — Z12.31 ENCOUNTER FOR SCREENING MAMMOGRAM FOR MALIGNANT NEOPLASM OF BREAST: ICD-10-CM

## 2021-03-30 DIAGNOSIS — Z12.11 COLON CANCER SCREENING: ICD-10-CM

## 2021-03-30 DIAGNOSIS — E66.09 CLASS 1 OBESITY DUE TO EXCESS CALORIES WITH SERIOUS COMORBIDITY AND BODY MASS INDEX (BMI) OF 33.0 TO 33.9 IN ADULT: ICD-10-CM

## 2021-03-30 DIAGNOSIS — E66.811 CLASS 1 OBESITY DUE TO EXCESS CALORIES WITH SERIOUS COMORBIDITY AND BODY MASS INDEX (BMI) OF 33.0 TO 33.9 IN ADULT: ICD-10-CM

## 2021-03-30 PROCEDURE — 3078F DIAST BP <80 MM HG: CPT | Performed by: FAMILY MEDICINE

## 2021-03-30 PROCEDURE — 99396 PREV VISIT EST AGE 40-64: CPT | Performed by: FAMILY MEDICINE

## 2021-03-30 PROCEDURE — 3074F SYST BP LT 130 MM HG: CPT | Performed by: FAMILY MEDICINE

## 2021-03-30 ASSESSMENT — PAIN SCALES - GENERAL: PAINLEVEL: 0-NO PAIN

## 2021-03-30 ASSESSMENT — PATIENT HEALTH QUESTIONNAIRE - PHQ9: SUM OF ALL RESPONSES TO PHQ9 QUESTIONS 1 & 2: 0

## 2021-03-30 NOTE — PROGRESS NOTES
Chief Complaint  Chief Complaint   Patient presents with   • Annual Exam     Due for mammo, colonoscopy. Unsure if she ever had DEXA. Getting first Covid vaccine 4/6.        History of Present Illness  61 yo female, here for overdue PE.  Had knee replacement surgery on right 9/20. Now that both are replaced can do more exercise. Bought a new treadmill.   Diet discussed and will work on that.  Has been working through pandemic and that has been stressful. Has vaccine appt scheduled.  Taking BP med w/o problems.  No CV c/o.  Due for mammo and colonoscopy.  Tdap UTD      Current Outpatient Medications   Medication Sig Dispense Refill   • lisinopriL (PRINIVIL) 10 mg tablet TAKE 1 TABLET BY MOUTH EVERY DAY 90 tablet 1     No current facility-administered medications for this visit.        Patient Active Problem List   Diagnosis   • Essential hypertension   • Cardiac murmur   • Anxiety   • Obesity due to excess calories   • History of knee replacement, total, bilateral       Past Medical History:   Diagnosis Date   • Anxiety 1/17/2019   • Cardiac murmur 1/17/2019   • Essential hypertension 1/17/2019   • History of knee replacement, total, bilateral 3/31/2021   • Hypertension    • Obesity due to excess calories 1/17/2019   • Osteoarthritis of knee 1/17/2019       Past Surgical History:   Procedure Laterality Date   • HERNIA REPAIR     • MOUTH SURGERY     • REPLACEMENT TOTAL KNEE Left 2019   • REPLACEMENT TOTAL KNEE Right 09/2020       Family History   Problem Relation Age of Onset   • Hypertension Biological Mother    • Thyroid disease Biological Sister    • Hypertension Biological Brother    • Thyroid disease Biological Brother    • Hypertension Maternal Grandfather    • Breast cancer Mother's Sister    • Testicular cancer Cousin    • Colon cancer Biological Father        Social History     Socioeconomic History   • Marital status:      Spouse name: Not on file   • Number of children: Not on file   • Years of  education: Not on file   • Highest education level: Not on file   Occupational History   • Not on file   Social Needs   • Financial resource strain: Not on file   • Food insecurity     Worry: Not on file     Inability: Not on file   • Transportation needs     Medical: Not on file     Non-medical: Not on file   Tobacco Use   • Smoking status: Never Smoker   • Smokeless tobacco: Never Used   Substance and Sexual Activity   • Alcohol use: Yes     Comment: occass   • Drug use: No   • Sexual activity: Defer   Lifestyle   • Physical activity     Days per week: Not on file     Minutes per session: Not on file   • Stress: Not on file   Relationships   • Social connections     Talks on phone: Not on file     Gets together: Not on file     Attends [employer] service: Not on file     Active member of club or organization: Not on file     Attends meetings of clubs or organizations: Not on file     Relationship status: Not on file   • Intimate partner violence     Fear of current or ex partner: Not on file     Emotionally abused: Not on file     Physically abused: Not on file     Forced sexual activity: Not on file   Other Topics Concern   • Not on file   Social History Narrative    [employer]        Allergies   Allergen Reactions   • Penicillins      Yeast infection       Review of Systems   Constitutional: Negative for unexpected weight change.   HENT: Negative for hearing loss.    Eyes: Negative for visual disturbance.   Respiratory: Negative for shortness of breath.    Cardiovascular: Negative for chest pain and palpitations.   Gastrointestinal: Negative for abdominal pain.   Endocrine: Negative for polyuria.   Genitourinary: Negative for difficulty urinating.   Musculoskeletal: Negative for joint swelling.   Skin: Negative for rash.   Allergic/Immunologic: Negative for immunocompromised state.   Neurological: Negative for weakness.   Hematological: Negative for adenopathy.   Psychiatric/Behavioral: Negative for dysphoric  "mood.        Stress at work       Vitals:    03/30/21 1525   BP: 100/60   BP Location: Right upper arm   Patient Position: Sitting   Pulse: 82   Resp: 16   Temp: 36.6 °C (97.8 °F)   TempSrc: Temporal   SpO2: 97%   Weight: 86.6 kg (191 lb)   Height: 1.6 m (5' 3\")     Body mass index is 33.83 kg/m².    Physical Exam  Vitals signs and nursing note reviewed.   Constitutional:       Appearance: She is well-developed.   HENT:      Head: Normocephalic.      Right Ear: Tympanic membrane, ear canal and external ear normal.      Left Ear: Tympanic membrane, ear canal and external ear normal.   Eyes:      Conjunctiva/sclera: Conjunctivae normal.   Neck:      Musculoskeletal: Normal range of motion.      Thyroid: No thyromegaly.      Trachea: No tracheal deviation.   Cardiovascular:      Rate and Rhythm: Normal rate and regular rhythm.      Heart sounds: Murmur (3/6 MARISOL) present. No friction rub. No gallop.    Pulmonary:      Effort: Pulmonary effort is normal.      Breath sounds: Normal breath sounds. No wheezing or rales.   Abdominal:      General: Bowel sounds are normal. There is no distension.      Palpations: Abdomen is soft.      Tenderness: There is no abdominal tenderness.   Musculoskeletal: Normal range of motion.   Lymphadenopathy:      Cervical: No cervical adenopathy.   Skin:     Findings: No rash.   Neurological:      General: No focal deficit present.   Psychiatric:         Behavior: Behavior normal.         Thought Content: Thought content normal.         Judgment: Judgment normal.         Problem List Items Addressed This Visit     Obesity due to excess calories     Weight loss encouraged.         Essential hypertension     Well controlled and same medication           Other Visit Diagnoses     Encounter for general adult medical examination without abnormal findings    -  Primary    Encouraged working on increase in exercise. Healthy diet ad weight loss stressed. Check fasting labs and catch up with HM.     " Encounter for screening mammogram for malignant neoplasm of breast        Relevant Orders    BI SCREENING MAMMOGRAM BILATERAL(TOMOSYNTHESIS)    Colon cancer screening        Relevant Orders    Direct Access Colonoscopy BMMSA          Return in about 6 months (around 9/30/2021) for Physical exam, Next scheduled follow-up.          Sue Thakkar MD  [facility]  Family Medicine in [place]  [address] [place] [address],  [address]  [place]  [address]  P: [phone]  F: [phone]

## 2021-03-31 PROBLEM — M17.9 OSTEOARTHRITIS OF KNEE: Status: RESOLVED | Noted: 2019-01-17 | Resolved: 2021-03-31

## 2021-03-31 PROBLEM — Z96.653 HISTORY OF KNEE REPLACEMENT, TOTAL, BILATERAL: Status: ACTIVE | Noted: 2021-03-31

## 2021-03-31 ASSESSMENT — ENCOUNTER SYMPTOMS
ABDOMINAL PAIN: 0
DYSPHORIC MOOD: 0
JOINT SWELLING: 0
UNEXPECTED WEIGHT CHANGE: 0
PALPITATIONS: 0
ADENOPATHY: 0
SHORTNESS OF BREATH: 0
DIFFICULTY URINATING: 0
WEAKNESS: 0

## 2021-04-06 ENCOUNTER — IMMUNIZATION (OUTPATIENT)
Dept: IMMUNIZATION | Facility: CLINIC | Age: 61
End: 2021-04-06

## 2021-04-27 ENCOUNTER — IMMUNIZATION (OUTPATIENT)
Dept: IMMUNIZATION | Facility: CLINIC | Age: 61
End: 2021-04-27

## 2021-07-20 ENCOUNTER — OFFICE VISIT (OUTPATIENT)
Dept: FAMILY MEDICINE | Facility: CLINIC | Age: 61
End: 2021-07-20
Payer: COMMERCIAL

## 2021-07-20 VITALS
TEMPERATURE: 98 F | BODY MASS INDEX: 34.91 KG/M2 | RESPIRATION RATE: 18 BRPM | DIASTOLIC BLOOD PRESSURE: 80 MMHG | OXYGEN SATURATION: 98 % | HEIGHT: 63 IN | HEART RATE: 78 BPM | WEIGHT: 197 LBS | SYSTOLIC BLOOD PRESSURE: 116 MMHG

## 2021-07-20 DIAGNOSIS — B02.9 HERPES ZOSTER WITHOUT COMPLICATION: ICD-10-CM

## 2021-07-20 DIAGNOSIS — L29.9 SCALP PRURITUS: Primary | ICD-10-CM

## 2021-07-20 PROCEDURE — 3008F BODY MASS INDEX DOCD: CPT | Performed by: NURSE PRACTITIONER

## 2021-07-20 PROCEDURE — 99213 OFFICE O/P EST LOW 20 MIN: CPT | Performed by: NURSE PRACTITIONER

## 2021-07-20 PROCEDURE — 3079F DIAST BP 80-89 MM HG: CPT | Performed by: NURSE PRACTITIONER

## 2021-07-20 PROCEDURE — 3074F SYST BP LT 130 MM HG: CPT | Performed by: NURSE PRACTITIONER

## 2021-07-20 RX ORDER — VALACYCLOVIR HYDROCHLORIDE 1 G/1
1000 TABLET, FILM COATED ORAL 3 TIMES DAILY
Qty: 21 TABLET | Refills: 0 | Status: SHIPPED | OUTPATIENT
Start: 2021-07-20 | End: 2021-07-27

## 2021-07-20 ASSESSMENT — ENCOUNTER SYMPTOMS
FEVER: 0
FATIGUE: 1
VOMITING: 0
DIARRHEA: 0
ABDOMINAL PAIN: 0
CONSTIPATION: 0
NAUSEA: 0
CHILLS: 0
WHEEZING: 0
COUGH: 0
SHORTNESS OF BREATH: 0
HEADACHES: 0

## 2021-07-20 NOTE — PATIENT INSTRUCTIONS
Your rash has the appearance of shingles, which may occur in any adult who has had chickenpox as a child.  It is usually brought about in part by stress, or weakened immune system.  Antiviral as prescribed, avoid contact with others if vesicles are open and until crusted over.  Avoid contact with pregnant women, or very young children until the rash is resolved.  Shingles vaccine is recommended for patient 60 years of age or older, but cannot be used to treat shingles breakouts    The shingles rash usually lasts between 2 and 3 weeks, but symptoms may persist beyond this.  Avoid touching the rash.  Wash your hands frequently    Ordered valacyclovir 1 g 3 times daily x7 days.

## 2021-07-20 NOTE — PROGRESS NOTES
[facility]  [address] [place] [address]  [phone]     Reason for visit:   Chief Complaint   Patient presents with   • Follow-up     Itchy Hives on the back of scalp, ssemms like it is traveling up her scalp. Burning/tingling sensation for the last few couple weeks      HPI   Leny Burton is a 60 y.o. female who presents with recurrent scalp itch and irritation in a linear pattern with itching/tingling for the last few weeks, intermittent redness and hives like swelling at the site. Pt states discomfort comes and goes, sometimes intense.         Medical History:  Past Medical History:   Diagnosis Date   • Anxiety 1/17/2019   • Cardiac murmur 1/17/2019   • Essential hypertension 1/17/2019   • History of knee replacement, total, bilateral 3/31/2021   • Hypertension    • Obesity due to excess calories 1/17/2019   • Osteoarthritis of knee 1/17/2019       Surgical History:  Past Surgical History:   Procedure Laterality Date   • HERNIA REPAIR     • MOUTH SURGERY     • REPLACEMENT TOTAL KNEE Left 2019   • REPLACEMENT TOTAL KNEE Right 09/2020       Social History:  Social History     Social History Narrative    [employer]        Family History:  Family History   Problem Relation Age of Onset   • Hypertension Biological Mother    • Thyroid disease Biological Sister    • Hypertension Biological Brother    • Thyroid disease Biological Brother    • Hypertension Maternal Grandfather    • Breast cancer Mother's Sister    • Testicular cancer Cousin    • Colon cancer Biological Father        Allergies:  Penicillins    Current Medications:  Current Outpatient Medications   Medication Sig Dispense Refill   • lisinopriL (PRINIVIL) 10 mg tablet TAKE 1 TABLET BY MOUTH EVERY DAY 90 tablet 1     No current facility-administered medications for this visit.       Review of Systems:  Review of Systems   Constitutional: Positive for fatigue (stressed). Negative for chills and fever.   HENT:         Scalp itch/tingle R occipital, does not cross midline   Respiratory: Negative for cough, shortness of breath and wheezing.    Cardiovascular: Negative for chest pain.   Gastrointestinal: Negative for abdominal pain, constipation, diarrhea, nausea and vomiting.   Neurological: Negative for headaches.       Objective     Vital Signs:  Vitals:    07/20/21 1710   BP: 116/80   Pulse: 78   Resp: 18   Temp: 36.7 °C (98 °F)   SpO2: 98%       BMI:  Body mass index is 34.91 kg/m².     Physical Exam  Constitutional:       Appearance: Normal appearance.   HENT:      Head: Normocephalic and atraumatic.     Cardiovascular:      Rate and Rhythm: Normal rate and regular rhythm.      Heart sounds: Murmur heard.   Systolic murmur is present with a grade of 2/6.        Comments: LUSB  Pulmonary:      Effort: Pulmonary effort is normal.      Breath sounds: Normal breath sounds.   Neurological:      Mental Status: She is alert and oriented to person, place, and time.   Psychiatric:         Attention and Perception: Attention and perception normal.         Mood and Affect: Mood and affect normal.         Speech: Speech normal.         Behavior: Behavior normal. Behavior is cooperative.         Thought Content: Thought content normal.         Cognition and Memory: Cognition normal.         Judgment: Judgment normal.         Recent labs before today:     Lab Results   Component Value Date    WBC 17.52 (H) 09/26/2020    HGB 11.5 (L) 09/26/2020    HCT 36.4 09/26/2020     09/26/2020    CHOL 181 08/23/2019    TRIG 81 08/23/2019    HDL 60 08/23/2019    ALT 20 05/24/2019    AST 21 05/24/2019     09/26/2020    K 4.6 09/26/2020     (H) 09/26/2020    CREATININE 0.7 09/26/2020    BUN 14 09/26/2020    CO2 21 (L) 09/26/2020    TSH 2.900 05/24/2019    HGBA1C 5.6 05/24/2019        Procedures   Assessment     There are no Patient Instructions on file for this visit.  [unfilled]  Problem List Items Addressed This Visit     None       Visit Diagnoses     Scalp pruritus    -  Primary             I spent 25 minutes on this date of service performing the following activities: obtaining history, performing examination, entering orders, documenting, providing counseling and education           Alexy Soares DNP, ILANA  7/20/2021

## 2021-07-27 ENCOUNTER — OFFICE VISIT (OUTPATIENT)
Dept: FAMILY MEDICINE | Facility: CLINIC | Age: 61
End: 2021-07-27
Payer: COMMERCIAL

## 2021-07-27 VITALS
BODY MASS INDEX: 34.8 KG/M2 | OXYGEN SATURATION: 98 % | RESPIRATION RATE: 18 BRPM | SYSTOLIC BLOOD PRESSURE: 112 MMHG | HEART RATE: 80 BPM | TEMPERATURE: 97.6 F | WEIGHT: 196.4 LBS | DIASTOLIC BLOOD PRESSURE: 60 MMHG | HEIGHT: 63 IN

## 2021-07-27 DIAGNOSIS — J34.89 NOSE IRRITATION: ICD-10-CM

## 2021-07-27 DIAGNOSIS — R23.8 SCALP IRRITATION: Primary | ICD-10-CM

## 2021-07-27 PROCEDURE — 3074F SYST BP LT 130 MM HG: CPT | Performed by: FAMILY MEDICINE

## 2021-07-27 PROCEDURE — 3078F DIAST BP <80 MM HG: CPT | Performed by: FAMILY MEDICINE

## 2021-07-27 PROCEDURE — 3008F BODY MASS INDEX DOCD: CPT | Performed by: FAMILY MEDICINE

## 2021-07-27 PROCEDURE — 99213 OFFICE O/P EST LOW 20 MIN: CPT | Performed by: FAMILY MEDICINE

## 2021-07-27 RX ORDER — KETOCONAZOLE 20 MG/ML
SHAMPOO, SUSPENSION TOPICAL 2 TIMES WEEKLY
Qty: 120 ML | Refills: 0 | Status: SHIPPED | OUTPATIENT
Start: 2021-07-29 | End: 2021-08-12 | Stop reason: SDUPTHER

## 2021-07-27 ASSESSMENT — ENCOUNTER SYMPTOMS
DIZZINESS: 0
ARTHRALGIAS: 0
CHILLS: 0
SINUS PRESSURE: 0
JOINT SWELLING: 0
SORE THROAT: 0
HEADACHES: 0
FEVER: 0
LIGHT-HEADEDNESS: 0
SINUS PAIN: 0

## 2021-07-27 NOTE — PROGRESS NOTES
"  Subjective      Patient ID: Leny Burton is a 60 y.o. female.  1960      HPI   Patient presents for acute symptoms.    Rash on scalp. Possible shingles - started valtrex at previous visit - did not take it yet. Wants to see phsycian before starting. Some itching. Has switched shampoo, pillow cases, collagen powder, etc.    Right nostril irritation after blowing her nose. Has used bacitracin.  Does not bother her that much. Only rarely.    The following have been reviewed and updated as appropriate in this visit:       Review of Systems   Constitutional: Negative for chills and fever.   HENT: Negative for congestion, nosebleeds, postnasal drip, sinus pressure, sinus pain and sore throat.    Musculoskeletal: Negative for arthralgias, gait problem and joint swelling.   Skin: Negative for pallor and rash.   Neurological: Negative for dizziness, light-headedness and headaches.       Objective     Vitals:    07/27/21 1201   BP: 112/60   BP Location: Right upper arm   Patient Position: Sitting   Pulse: 80   Resp: 18   Temp: 36.4 °C (97.6 °F)   SpO2: 98%   Weight: 89.1 kg (196 lb 6.4 oz)   Height: 1.6 m (5' 3\")     Body mass index is 34.79 kg/m².    Physical Exam  Constitutional:       Appearance: Normal appearance.   HENT:      Nose: Nose normal. No congestion or rhinorrhea.   Skin:     General: Skin is warm and dry.      Findings: No bruising, erythema, lesion or rash.   Neurological:      Mental Status: She is alert.         Assessment/Plan   Diagnoses and all orders for this visit:    Scalp irritation (Primary)  Comments:  likely irritation or fungal infection. prescribed antifungal shampoo. suggest to keep a diary of things that may have caused the symptoms. follow up as needed  Orders:  -     ketoconazole (NIZORAL) 2 % shampoo; Apply topically 2 (two) times a week (Mon, Thu). Apply to damp skin, lather, leave on 5 minutes, and rinse    Nose irritation  Comments:  nasal saline spray and vasaline as needed to " prevent dryness      I spent 20 minutes on this date of service performing the following activities: obtaining history, performing examination, entering orders, documenting, preparing for visit, obtaining / reviewing records and providing counseling and education.

## 2021-08-12 ENCOUNTER — TELEPHONE (OUTPATIENT)
Dept: FAMILY MEDICINE | Facility: CLINIC | Age: 61
End: 2021-08-12

## 2021-08-12 DIAGNOSIS — R23.8 SCALP IRRITATION: ICD-10-CM

## 2021-08-12 RX ORDER — KETOCONAZOLE 20 MG/ML
SHAMPOO, SUSPENSION TOPICAL 2 TIMES WEEKLY
Qty: 120 ML | Refills: 0 | Status: SHIPPED | OUTPATIENT
Start: 2021-08-12 | End: 2021-09-11

## 2021-08-12 NOTE — TELEPHONE ENCOUNTER
Voice mail from pt. Said  spilled her prescription shampoo down drain. Would like to get another script. [facility] in [place] [address] [address] [place]. Please call pt and advise when done

## 2021-08-18 ENCOUNTER — HOSPITAL ENCOUNTER (OUTPATIENT)
Dept: RADIOLOGY | Age: 61
Discharge: HOME | End: 2021-08-18
Attending: FAMILY MEDICINE
Payer: COMMERCIAL

## 2021-08-18 DIAGNOSIS — Z12.31 ENCOUNTER FOR SCREENING MAMMOGRAM FOR MALIGNANT NEOPLASM OF BREAST: ICD-10-CM

## 2021-08-18 PROCEDURE — 77063 BREAST TOMOSYNTHESIS BI: CPT

## 2021-08-18 PROCEDURE — 77067 SCR MAMMO BI INCL CAD: CPT

## 2021-09-03 ENCOUNTER — TRANSCRIBE ORDERS (OUTPATIENT)
Dept: REGISTRATION | Age: 61
End: 2021-09-03

## 2021-09-03 DIAGNOSIS — R92.8 OTHER ABNORMAL AND INCONCLUSIVE FINDINGS ON DIAGNOSTIC IMAGING OF BREAST: Primary | ICD-10-CM

## 2021-09-08 ENCOUNTER — TELEPHONE (OUTPATIENT)
Dept: FAMILY MEDICINE | Facility: CLINIC | Age: 61
End: 2021-09-08

## 2021-09-08 NOTE — TELEPHONE ENCOUNTER
Payer  JHONATAN Vazquez  Enter Servicescheckmark  Referred Fromcheckmark  Referred Tocheckmark  Review & Submitcheckmark  New Referral  payer  Exportexport  Copy Referral  Confirmation - Referral [id number]  Effective: 09/08/2021     Expires: 12/07/2021  Active  Referred From  [facility] [place]  Specialty: Family Practice  Group NPI: [id number]  Provider ID: [id number]  Tax ID: [id number]  [address] [place] [address]  Referred To  [facility]  Specialty: Radiology  Tier 2  Group NPI: [id number]  Provider ID: [id number]  Tax ID: [id number]  This referral is valid at any location for the above group  Patient Info  JHONATAN HAYES  [id number]  Female  1960  [address] [place] [address]  Clinical Information  Place of Service  On [facility]  Service Type  Medical Care  Diagnosis  1R92.8 - other abnormal and inconclusive findings on diagnostic imaging of breast  Procedures  739146 - diagnostic mammography, including computer-aided detection (cad) when performed; bilateral  Disclaimer  [employer] and its Affiliates ([facility]) will pay for only those services covered under the [facility] Contract which are specifically noted and requested by the PCP or OBGYN on the referral. If any additional services, testing, or follow-up care are required, the PCP or OBGYN must be contacted prior to the delivery of such additional services for written approval on a separate referral. Non-referred services will not be covered by [facility]. Benefits are underwritten or administered by [employer], a subsidiary of [employer], which are independent licensees of the Blue Cross and Blue Shield Association.  Overview  Terms & Conditions  PEAR Applications  PEAR Home  Practice Management  Plan News  [facility] Administrators  [facility] Administrators  [employer]  Help & Support  Help  Center  Contact Us  © 2021 [facility]. All Rights Reserved

## 2021-09-08 NOTE — TELEPHONE ENCOUNTER
Payer  JHONATAN Vazquez  Enter Servicescheckmark  Referred Fromcheckmark  Referred Tocheckmark  Review & Submitcheckmark  New Referral  payer  Exportexport  Copy Referral  Confirmation - Referral [id number]  Effective: 09/08/2021     Expires: 12/07/2021  Active  Referred From  [facility] [place]  Specialty: Family Practice  Group NPI: [id number]  Provider ID: [id number]  Tax ID: [id number]  [address] [place] [address]  Referred To  [facility]  Specialty: Radiology  Tier 2  Group NPI: [id number]  Provider ID: [id number]  Tax ID: [id number]  This referral is valid at any location for the above group  Patient Info  JHONATAN HAYES  [id number]  Female  1960  [address] [place] [address]  Clinical Information  Place of Service  On [facility]  Service Type  Medical Care  Diagnosis  1R92.8 - other abnormal and inconclusive findings on diagnostic imaging of breast  Procedures  552211 - ultrasound, breast, unilateral, real time with image documentation, including axilla when performed; limited  Disclaimer  [employer] and its Affiliates ([facility]) will pay for only those services covered under the [facility] Contract which are specifically noted and requested by the PCP or OBGYN on the referral. If any additional services, testing, or follow-up care are required, the PCP or OBGYN must be contacted prior to the delivery of such additional services for written approval on a separate referral. Non-referred services will not be covered by [facility]. Benefits are underwritten or administered by [employer], a subsidiary of [employer], which are independent licensees of the Blue Cross and Blue Shield Association.  Overview  Terms & Conditions  PEAR Applications  PEAR Home  Practice Management  Plan News  [facility] Administrators  [facility] Administrators  [employer]  Help &  Support  Help Center  Contact Us  © 2021 [facility]. All Rights Reserved

## 2021-09-10 ENCOUNTER — HOSPITAL ENCOUNTER (OUTPATIENT)
Dept: RADIOLOGY | Age: 61
Discharge: HOME | End: 2021-09-10
Attending: RADIOLOGY
Payer: COMMERCIAL

## 2021-09-10 DIAGNOSIS — R92.8 OTHER ABNORMAL AND INCONCLUSIVE FINDINGS ON DIAGNOSTIC IMAGING OF BREAST: ICD-10-CM

## 2021-09-10 LAB — HM MAMMOGRAM: NORMAL

## 2021-09-10 PROCEDURE — 77065 DX MAMMO INCL CAD UNI: CPT | Mod: RT

## 2021-09-10 PROCEDURE — 76642 ULTRASOUND BREAST LIMITED: CPT | Mod: RT

## 2021-09-26 DIAGNOSIS — I10 ESSENTIAL HYPERTENSION: ICD-10-CM

## 2021-09-27 RX ORDER — LISINOPRIL 10 MG/1
TABLET ORAL
Qty: 90 TABLET | Refills: 1 | Status: SHIPPED | OUTPATIENT
Start: 2021-09-27 | End: 2022-02-24

## 2021-10-04 ENCOUNTER — OFFICE VISIT (OUTPATIENT)
Dept: FAMILY MEDICINE | Facility: CLINIC | Age: 61
End: 2021-10-04
Payer: COMMERCIAL

## 2021-10-04 VITALS
HEIGHT: 63 IN | WEIGHT: 197 LBS | RESPIRATION RATE: 16 BRPM | TEMPERATURE: 97.5 F | OXYGEN SATURATION: 98 % | HEART RATE: 91 BPM | BODY MASS INDEX: 34.91 KG/M2 | DIASTOLIC BLOOD PRESSURE: 76 MMHG | SYSTOLIC BLOOD PRESSURE: 132 MMHG

## 2021-10-04 DIAGNOSIS — L29.9 SCALP PRURITUS: ICD-10-CM

## 2021-10-04 DIAGNOSIS — I83.91 VARICOSE VEINS OF RIGHT LOWER EXTREMITY, UNSPECIFIED WHETHER COMPLICATED: ICD-10-CM

## 2021-10-04 DIAGNOSIS — L65.9 HAIR LOSS: Primary | ICD-10-CM

## 2021-10-04 PROCEDURE — 3008F BODY MASS INDEX DOCD: CPT | Performed by: FAMILY MEDICINE

## 2021-10-04 PROCEDURE — 3075F SYST BP GE 130 - 139MM HG: CPT | Performed by: FAMILY MEDICINE

## 2021-10-04 PROCEDURE — 99214 OFFICE O/P EST MOD 30 MIN: CPT | Performed by: FAMILY MEDICINE

## 2021-10-04 PROCEDURE — 3078F DIAST BP <80 MM HG: CPT | Performed by: FAMILY MEDICINE

## 2021-10-04 RX ORDER — ELECTROLYTES/DEXTROSE
SOLUTION, ORAL ORAL DAILY
COMMUNITY
End: 2023-02-21

## 2021-10-04 ASSESSMENT — PAIN SCALES - GENERAL: PAINLEVEL: 0-NO PAIN

## 2021-10-04 NOTE — PROGRESS NOTES
Chief Complaint  Chief Complaint   Patient presents with   • Follow-up     pt is having lumps on scalp on lower cranium back of head starting in july. also has a vein on her right leg below her knee that has become painful. also has crack on her left thumb nail and is painful and bloodshot eyes with headaches       History of Present Illness  60-year-old female, here for evaluation of multiple complaints.  Has been having scalp itching on the back of scalp, and had hives briefly in that right occipital area,  and a few hives on forehead. On area below the right ear has been fairly persistently itchy and very localized and there is a small area of papules there.  No vesicles or drainage.  Never had any pain or sharp discomfort.  She did have an area of hair thinning on the vertex which was accompanied by a pink discoloration on the scalp that started in July.  Of the past several weeks, the hair seems to be coming back a little bit and the pinkness on the scalp has faded there.   Used topical Benadryl and only temporary relief.  No rash ever seen but in last 2 weeks has a bite like rash on left ear.  She has been outdoors a bit and it could be a mosquito bite.   Has noted some improvement in hair loss.   No dandruff or flaking skin.  She has not had any hives or skin lesions elsewhere.  Does color hair.     and coworkers feel her eyes have been blood shot.  That was going on for few weeks.  She was having some dental work and after taking Keflex, she thought the bloodshot eyes seem much better.  She does have dry eyes.  She also has facial rosacea.    Has been having dental work for a bridge. Took 2 Keflex before each dental procedure.  Has a crack along left thumb nail. It seemed the discomfort around the crack was better after taking the Keflex.  There is a persistent linear ridge there.  No pus or erythema.    Varicose vein on right anterior leg is more symptomatic.  Seems to be a little puffier and more  notable when she is on her legs for longer periods of time.      Current Outpatient Medications   Medication Sig Dispense Refill   • biotin 5 mg capsule Take by mouth daily.     • lisinopriL (PRINIVIL) 10 mg tablet TAKE 1 TABLET BY MOUTH EVERY DAY 90 tablet 1     No current facility-administered medications for this visit.       Patient Active Problem List   Diagnosis   • Essential hypertension   • Cardiac murmur   • Anxiety   • Obesity due to excess calories   • History of knee replacement, total, bilateral       Past Medical History:   Diagnosis Date   • Anxiety 1/17/2019   • Cardiac murmur 1/17/2019   • Essential hypertension 1/17/2019   • History of knee replacement, total, bilateral 3/31/2021   • Hypertension    • Obesity due to excess calories 1/17/2019   • Osteoarthritis of knee 1/17/2019       Past Surgical History:   Procedure Laterality Date   • HERNIA REPAIR     • MOUTH SURGERY     • REPLACEMENT TOTAL KNEE Left 2019   • REPLACEMENT TOTAL KNEE Right 09/2020       Family History   Problem Relation Age of Onset   • Hypertension Biological Mother    • Thyroid disease Biological Sister    • Hypertension Biological Brother    • Thyroid disease Biological Brother    • Hypertension Maternal Grandfather    • Breast cancer Mother's Sister    • Testicular cancer Cousin    • Colon cancer Biological Father        Social History     Socioeconomic History   • Marital status:      Spouse name: Not on file   • Number of children: Not on file   • Years of education: Not on file   • Highest education level: Not on file   Occupational History   • Not on file   Tobacco Use   • Smoking status: Never Smoker   • Smokeless tobacco: Never Used   Substance and Sexual Activity   • Alcohol use: Yes     Comment: occass   • Drug use: No   • Sexual activity: Defer   Other Topics Concern   • Not on file   Social History Narrative    [employer]      Social Determinants of Health     Financial Resource Strain:    • Difficulty of  "Paying Living Expenses: Not on file   Food Insecurity:    • Worried About Running Out of Food in the Last Year: Not on file   • Ran Out of Food in the Last Year: Not on file   Transportation Needs:    • Lack of Transportation (Medical): Not on file   • Lack of Transportation (Non-Medical): Not on file   Physical Activity:    • Days of Exercise per Week: Not on file   • Minutes of Exercise per Session: Not on file   Stress:    • Feeling of Stress : Not on file   Social Connections:    • Frequency of Communication with Friends and Family: Not on file   • Frequency of Social Gatherings with Friends and Family: Not on file   • Attends Yarsanism Services: Not on file   • Active Member of Clubs or Organizations: Not on file   • Attends Club or Organization Meetings: Not on file   • Marital Status: Not on file   Intimate Partner Violence:    • Fear of Current or Ex-Partner: Not on file   • Emotionally Abused: Not on file   • Physically Abused: Not on file   • Sexually Abused: Not on file   Housing Stability:    • Unable to Pay for Housing in the Last Year: Not on file   • Number of Places Lived in the Last Year: Not on file   • Unstable Housing in the Last Year: Not on file       Allergies   Allergen Reactions   • Penicillins      Yeast infection       Review of Systems   Constitutional:        As in HPI       Vitals:    10/04/21 1629   BP: 132/76   BP Location: Right upper arm   Patient Position: Sitting   Pulse: 91   Resp: 16   Temp: 36.4 °C (97.5 °F)   TempSrc: Temporal   SpO2: 98%   Weight: 89.4 kg (197 lb)   Height: 1.6 m (5' 3\")     Body mass index is 34.9 kg/m².    Physical Exam  Vitals and nursing note reviewed.   Eyes:      General:         Right eye: No discharge.         Left eye: No discharge.      Conjunctiva/sclera: Conjunctivae normal.   Musculoskeletal:      Comments: Left thumbnail has a palpable linear ridge but without any signs or symptoms of infection and no obvious open skin.    Right pretibial area " shows a horizontally to diagonally positioned dilated varicose vein which is slightly tender to touch.   Skin:     Comments: There is a mild amount of thinning at the vertex of the scalp but some new growth is seen and there is no scalp abnormality below it now.  She did show me a photo with some faint erythema in the same location without any skin lesions.    In the posterior auricular area on the right there is a raised pearly and pink skin lesion with mild surrounding erythema in the area where she describes a lot of itching.   Neurological:      Mental Status: She is alert.         Problem List Items Addressed This Visit     None      Visit Diagnoses     Hair loss    -  Primary    This is now improving.  It did seem to start with inflammation at the scalp in the region of hair loss.  Rule out lichen planus Polarus or other inflammatory ca    Relevant Orders    Comprehensive metabolic panel    CBC and Differential    Lipid Prof w Refl    TSH w reflex FT4    Hemoglobin A1c    CHINO Screen (Automated)    Scalp pruritus        The right sided very pruritic area could be a basal cell skin lesion or allergic in nature and may need biopsy.  Referred to Dr. Galeas's practice.    Varicose veins of right lower extremity, unspecified whether complicated        Ref Dr Colbert for eval.    Relevant Orders    Ambulatory referral to Cardiology      She is overdue for physical and this is scheduled in a few weeks and she should have fasting labs prior and importance stressed.  The fingernail issue looks resolved and does not need further attention.  The redness of the eyes could be from dry eye but also could be a rosacea of the eye and she will schedule with her ophthalmologist to evaluate.  Currently there are no symptoms there.  She will have the hair loss and scalp issues addressed by dermatology as above, and await cardiovascular assessment of her varicose veins regarding vein function.    Return if symptoms worsen or  fail to improve, for Next scheduled follow-up.          Sue Thakkar MD  [facility]  Family Medicine in [place]  [address],  [address]  [place]  [address]  P:   F: [phone]

## 2021-10-06 ENCOUNTER — TELEPHONE (OUTPATIENT)
Dept: FAMILY MEDICINE | Facility: CLINIC | Age: 61
End: 2021-10-06

## 2021-10-06 NOTE — TELEPHONE ENCOUNTER
Confirmation - Referral [id number]  Effective: 10/06/2021     Expires: 01/04/2022  Active  Referred From  [facility] IN [place]  Specialty: Family Practice  Group NPI: [id number]  Provider ID: [id number]  Tax ID: [id number]  [address] [place] [address]  Referred To  [facility]  Specialty: Dermatology  Tier 2  Group NPI: [id number]  Provider ID: [id number]  Tax ID: [id number]  Individual Provider NPI: [id number]  Provider Name: ABBIE ANGELO MD  This referral is valid at any location for the above group  Patient Info  JHONATAN HAYES  [id number]  Female  1960  [address] [place] [address]  Clinical Information  Place of Service  Office  Service Type  Medical Care  Diagnosis  1R68.89 - other general symptoms and signs  Procedures  199499 - unlisted evaluation and management service  Disclaimer  [employer] and its Affiliates ([facility]) will pay for only those services covered under the [facility] Contract which are specifically noted and requested by the PCP or OBGYN on the referral. If any additional services, testing, or follow-up care are required, the PCP or OBGYN must be contacted prior to the delivery of such additional services for written approval on a separate referral. Non-referred services will not be covered by [facility]. Benefits are underwritten or administered by [employer], a subsidiary of [employer], which are independent licensees of the Blue Cross and Blue Shield Association.

## 2021-10-07 ENCOUNTER — TELEPHONE (OUTPATIENT)
Dept: FAMILY MEDICINE | Facility: CLINIC | Age: 61
End: 2021-10-07

## 2021-10-07 DIAGNOSIS — I83.91 VARICOSE VEINS OF RIGHT LOWER EXTREMITY, UNSPECIFIED WHETHER COMPLICATED: Primary | ICD-10-CM

## 2021-10-07 NOTE — TELEPHONE ENCOUNTER
Confirmation - Referral [id number]  Effective: 10/07/2021     Expires: 01/05/2022  Active  Referred From  [facility] IN [place]  Specialty: Family Practice  Group NPI: [id number]  Provider ID: [id number]  Tax ID: [id number]  [address] [place] [address]  Referred To  [facility]  Specialty: Ophthalmology  Tier 2  Group NPI: [id number]  Provider ID: [id number]  Tax ID: [id number]  This referral is valid at any location for the above group  Patient Info  JHONATAN HAYES  [id number]  Female  1960  [address] [place] [address]  Clinical Information  Place of Service  Office  Service Type  Medical Care  Diagnosis  1R68.89 - other general symptoms and signs  Procedures  199499 - unlisted evaluation and management service  Disclaimer  [employer] and its Affiliates ([facility]) will pay for only those services covered under the [facility] Contract which are specifically noted and requested by the PCP or OBGYN on the referral. If any additional services, testing, or follow-up care are required, the PCP or OBGYN must be contacted prior to the delivery of such additional services for written approval on a separate referral. Non-referred services will not be covered by [facility]. Benefits are underwritten or administered by [employer], a subsidiary of [employer], which are independent licensees of the Blue Cross and Blue Shield Association.

## 2021-10-08 ENCOUNTER — TELEPHONE (OUTPATIENT)
Dept: FAMILY MEDICINE | Facility: CLINIC | Age: 61
End: 2021-10-08

## 2021-10-11 ENCOUNTER — TELEPHONE (OUTPATIENT)
Dept: FAMILY MEDICINE | Facility: CLINIC | Age: 61
End: 2021-10-11

## 2021-10-11 NOTE — TELEPHONE ENCOUNTER
Confirmation - Referral [id number]  Effective: 10/11/2021     Expires: 01/09/2022  Active  Referred From  [facility] IN [place]  Specialty: Family Practice  Group NPI: [id number]  Provider ID: [id number]  Tax ID: [id number]  [address] [place] [address]  Referred To  [facility]  Specialty: Dermatology  Tier 2  Group NPI: [id number]  Provider ID: [id number]  Tax ID: [id number]  This referral is valid at any location for the above group  Patient Info  JHONATAN HAYES  [id number]  Female  1960  [address] [place] [address]  Clinical Information  Place of Service  Office  Service Type  Medical Care  Diagnosis  1R68.89 - other general symptoms and signs  Procedures  199499 - unlisted evaluation and management service  Disclaimer  [employer] and its Affiliates ([facility]) will pay for only those services covered under the [facility] Contract which are specifically noted and requested by the PCP or OBGYN on the referral. If any additional services, testing, or follow-up care are required, the PCP or OBGYN must be contacted prior to the delivery of such additional services for written approval on a separate referral. Non-referred services will not be covered by [facility]. Benefits are underwritten or administered by [employer], a subsidiary of [employer], which are independent licensees of the Blue Cross and Blue Shield Association.

## 2021-10-20 LAB
ALBUMIN SERPL-MCNC: 4 G/DL (ref 3.8–4.8)
ALBUMIN/GLOB SERPL: 1.5 {RATIO} (ref 1.2–2.2)
ALP SERPL-CCNC: 109 IU/L (ref 44–121)
ALT SERPL-CCNC: 18 IU/L (ref 0–32)
ANA TITR SER IF: NEGATIVE {TITER}
AST SERPL-CCNC: 21 IU/L (ref 0–40)
BASOPHILS # BLD AUTO: 0.1 X10E3/UL (ref 0–0.2)
BASOPHILS NFR BLD AUTO: 1 %
BILIRUB SERPL-MCNC: <0.2 MG/DL (ref 0–1.2)
BUN SERPL-MCNC: 14 MG/DL (ref 8–27)
BUN/CREAT SERPL: 19 (ref 12–28)
CALCIUM SERPL-MCNC: 9.5 MG/DL (ref 8.7–10.3)
CHLORIDE SERPL-SCNC: 105 MMOL/L (ref 96–106)
CHOLEST SERPL-MCNC: 183 MG/DL (ref 100–199)
CO2 SERPL-SCNC: 26 MMOL/L (ref 20–29)
CREAT SERPL-MCNC: 0.74 MG/DL (ref 0.57–1)
EOSINOPHIL # BLD AUTO: 0.3 X10E3/UL (ref 0–0.4)
EOSINOPHIL NFR BLD AUTO: 4 %
ERYTHROCYTE [DISTWIDTH] IN BLOOD BY AUTOMATED COUNT: 13.2 % (ref 11.7–15.4)
GLOBULIN SER CALC-MCNC: 2.6 G/DL (ref 1.5–4.5)
GLUCOSE SERPL-MCNC: 98 MG/DL (ref 65–99)
HBA1C MFR BLD: 5.9 % (ref 4.8–5.6)
HCT VFR BLD AUTO: 38.5 % (ref 34–46.6)
HDLC SERPL-MCNC: 56 MG/DL
HGB BLD-MCNC: 12.8 G/DL (ref 11.1–15.9)
IMM GRANULOCYTES # BLD AUTO: 0 X10E3/UL (ref 0–0.1)
IMM GRANULOCYTES NFR BLD AUTO: 0 %
LAB CORP EGFR IF AFRICN AM: 101 ML/MIN/1.73
LAB CORP EGFR IF NONAFRICN AM: 88 ML/MIN/1.73
LABORATORY COMMENT REPORT: NORMAL
LDLC SERPL CALC-MCNC: 111 MG/DL (ref 0–99)
LDLC/HDLC SERPL: 2 RATIO (ref 0–3.2)
LYMPHOCYTES # BLD AUTO: 1.5 X10E3/UL (ref 0.7–3.1)
LYMPHOCYTES NFR BLD AUTO: 21 %
MCH RBC QN AUTO: 27.9 PG (ref 26.6–33)
MCHC RBC AUTO-ENTMCNC: 33.2 G/DL (ref 31.5–35.7)
MCV RBC AUTO: 84 FL (ref 79–97)
MONOCYTES # BLD AUTO: 0.7 X10E3/UL (ref 0.1–0.9)
MONOCYTES NFR BLD AUTO: 10 %
NEUTROPHILS # BLD AUTO: 4.6 X10E3/UL (ref 1.4–7)
NEUTROPHILS NFR BLD AUTO: 64 %
PLATELET # BLD AUTO: 259 X10E3/UL (ref 150–450)
POTASSIUM SERPL-SCNC: 5.1 MMOL/L (ref 3.5–5.2)
PROT SERPL-MCNC: 6.6 G/DL (ref 6–8.5)
RBC # BLD AUTO: 4.58 X10E6/UL (ref 3.77–5.28)
SODIUM SERPL-SCNC: 143 MMOL/L (ref 134–144)
T4 FREE SERPL-MCNC: 1.1 NG/DL (ref 0.82–1.77)
TRIGL SERPL-MCNC: 86 MG/DL (ref 0–149)
TSH SERPL DL<=0.005 MIU/L-ACNC: 3.28 UIU/ML (ref 0.45–4.5)
VLDLC SERPL CALC-MCNC: 16 MG/DL (ref 5–40)
WBC # BLD AUTO: 7.2 X10E3/UL (ref 3.4–10.8)

## 2021-10-26 ENCOUNTER — OFFICE VISIT (OUTPATIENT)
Dept: FAMILY MEDICINE | Facility: CLINIC | Age: 61
End: 2021-10-26
Payer: COMMERCIAL

## 2021-10-26 VITALS
HEIGHT: 63 IN | OXYGEN SATURATION: 98 % | HEART RATE: 98 BPM | SYSTOLIC BLOOD PRESSURE: 120 MMHG | RESPIRATION RATE: 14 BRPM | WEIGHT: 200.6 LBS | BODY MASS INDEX: 35.54 KG/M2 | TEMPERATURE: 97.9 F | DIASTOLIC BLOOD PRESSURE: 72 MMHG

## 2021-10-26 DIAGNOSIS — L66.11: ICD-10-CM

## 2021-10-26 DIAGNOSIS — R01.1 CARDIAC MURMUR: ICD-10-CM

## 2021-10-26 DIAGNOSIS — Z23 NEED FOR INFLUENZA VACCINATION: ICD-10-CM

## 2021-10-26 DIAGNOSIS — I10 ESSENTIAL HYPERTENSION: Primary | ICD-10-CM

## 2021-10-26 PROCEDURE — 99214 OFFICE O/P EST MOD 30 MIN: CPT | Mod: 25 | Performed by: FAMILY MEDICINE

## 2021-10-26 PROCEDURE — 3074F SYST BP LT 130 MM HG: CPT | Performed by: FAMILY MEDICINE

## 2021-10-26 PROCEDURE — 3078F DIAST BP <80 MM HG: CPT | Performed by: FAMILY MEDICINE

## 2021-10-26 PROCEDURE — 3008F BODY MASS INDEX DOCD: CPT | Performed by: FAMILY MEDICINE

## 2021-10-26 PROCEDURE — 90686 IIV4 VACC NO PRSV 0.5 ML IM: CPT | Performed by: FAMILY MEDICINE

## 2021-10-26 PROCEDURE — 90471 IMMUNIZATION ADMIN: CPT | Performed by: FAMILY MEDICINE

## 2021-10-26 ASSESSMENT — ENCOUNTER SYMPTOMS
SHORTNESS OF BREATH: 0
UNEXPECTED WEIGHT CHANGE: 0
JOINT SWELLING: 0
DYSPHORIC MOOD: 0
WEAKNESS: 0

## 2021-10-26 ASSESSMENT — PAIN SCALES - GENERAL: PAINLEVEL: 0-NO PAIN

## 2021-10-26 NOTE — ASSESSMENT & PLAN NOTE
Last echo 2019, normal.  Exam discordant. Will see Dr Loving for evaluation.   She will be seeing him for venous insufficiency evaluation and will address the murmur during the vascular evaluation as well and she may need a follow-up echo.

## 2021-10-26 NOTE — PROGRESS NOTES
Chief Complaint  Chief Complaint   Patient presents with   • Follow-up     pt here to go over Mammo and b/w results       History of Present Illness  61 yo female, here for follow up.   Had mammogram with right asymmetry and subsequent repeat with additional views and u/s showed cluster of cysts.  Aware to repeat right imaging in 6 mos and reviewed.   Saw derm for her skin and scalp problem.  Scalp inflammation and hair loss may have been due to lichen planus pilaris and tried steroid drops.  She will be following up with her.  Reviewed labs and elevated A1c and implications discussed.   Other labs reviewed and look good.   BP control looks good.   Interested in flu vaccine.  Not sure she wants COVID 19 vaccine booster.   Discussed risks/ benefits.  Reviewed Shingrix and could consider getting that if she is not getting the COVID-19 booster as it is 2 shots that are 2 months to 6 months apart but should not be done around a few week window of the COVID-19 booster.      Current Outpatient Medications   Medication Sig Dispense Refill   • biotin 5 mg capsule Take by mouth daily.     • lisinopriL (PRINIVIL) 10 mg tablet TAKE 1 TABLET BY MOUTH EVERY DAY 90 tablet 1     No current facility-administered medications for this visit.       Patient Active Problem List   Diagnosis   • Essential hypertension   • Cardiac murmur   • Anxiety   • Obesity due to excess calories   • History of knee replacement, total, bilateral   • Lichen planus follicularis   • Varicose veins of right lower extremity with pain       Past Medical History:   Diagnosis Date   • Anxiety 1/17/2019   • Cardiac murmur 1/17/2019   • Essential hypertension 1/17/2019   • History of knee replacement, total, bilateral 3/31/2021   • Hypertension    • Obesity due to excess calories 1/17/2019   • Osteoarthritis of knee 1/17/2019       Past Surgical History:   Procedure Laterality Date   • HERNIA REPAIR     • MOUTH SURGERY     • REPLACEMENT TOTAL KNEE Left 2019   •  REPLACEMENT TOTAL KNEE Right 09/2020       Family History   Problem Relation Age of Onset   • Hypertension Biological Mother    • Thyroid disease Biological Sister    • Hypertension Biological Brother    • Thyroid disease Biological Brother    • Hypertension Maternal Grandfather    • Breast cancer Mother's Sister    • Testicular cancer Cousin    • Colon cancer Biological Father        Social History     Socioeconomic History   • Marital status:      Spouse name: Not on file   • Number of children: 2   • Years of education: Not on file   • Highest education level: Not on file   Occupational History   • Not on file   Tobacco Use   • Smoking status: Never Smoker   • Smokeless tobacco: Never Used   Substance and Sexual Activity   • Alcohol use: Yes     Comment: occass   • Drug use: No   • Sexual activity: Defer   Other Topics Concern   • Not on file   Social History Narrative    [employer]      Social Determinants of Health     Financial Resource Strain:    • Difficulty of Paying Living Expenses: Not on file   Food Insecurity:    • Worried About Running Out of Food in the Last Year: Not on file   • Ran Out of Food in the Last Year: Not on file   Transportation Needs:    • Lack of Transportation (Medical): Not on file   • Lack of Transportation (Non-Medical): Not on file   Physical Activity:    • Days of Exercise per Week: Not on file   • Minutes of Exercise per Session: Not on file   Stress:    • Feeling of Stress : Not on file   Social Connections:    • Frequency of Communication with Friends and Family: Not on file   • Frequency of Social Gatherings with Friends and Family: Not on file   • Attends Rastafari Services: Not on file   • Active Member of Clubs or Organizations: Not on file   • Attends Club or Organization Meetings: Not on file   • Marital Status: Not on file   Intimate Partner Violence:    • Fear of Current or Ex-Partner: Not on file   • Emotionally Abused: Not on file   • Physically Abused: Not  "on file   • Sexually Abused: Not on file   Housing Stability:    • Unable to Pay for Housing in the Last Year: Not on file   • Number of Places Lived in the Last Year: Not on file   • Unstable Housing in the Last Year: Not on file       Allergies   Allergen Reactions   • Penicillins      Yeast infection       Review of Systems   Constitutional: Negative for unexpected weight change.   HENT: Negative for hearing loss.    Eyes: Negative for visual disturbance.   Respiratory: Negative for shortness of breath.    Cardiovascular: Negative for chest pain and leg swelling.   Genitourinary: Negative for difficulty urinating.   Musculoskeletal: Negative for joint swelling.   Skin: Negative for rash.   Allergic/Immunologic: Negative for immunocompromised state.   Neurological: Negative for weakness.   Hematological: Negative for adenopathy.   Psychiatric/Behavioral: Negative for dysphoric mood.       Vitals:    10/26/21 1640   BP: 120/72   BP Location: Right upper arm   Patient Position: Sitting   Pulse: 98   Resp: 14   Temp: 36.6 °C (97.9 °F)   TempSrc: Temporal   SpO2: 98%   Weight: 91 kg (200 lb 9.6 oz)   Height: 1.6 m (5' 3\")     Body mass index is 35.53 kg/m².    Physical Exam  Vitals and nursing note reviewed.   Constitutional:       Appearance: She is well-developed.   HENT:      Head: Normocephalic.      Right Ear: Tympanic membrane, ear canal and external ear normal.      Left Ear: Tympanic membrane, ear canal and external ear normal.   Eyes:      Conjunctiva/sclera: Conjunctivae normal.   Neck:      Thyroid: No thyromegaly.      Trachea: No tracheal deviation.   Cardiovascular:      Rate and Rhythm: Normal rate and regular rhythm.      Heart sounds: Murmur (3/6 MARISOL) heard.       Pulmonary:      Effort: Pulmonary effort is normal.      Breath sounds: Normal breath sounds. No wheezing or rales.   Abdominal:      General: Bowel sounds are normal. There is no distension.      Palpations: Abdomen is soft.      " Tenderness: There is no abdominal tenderness.   Musculoskeletal:         General: Normal range of motion.      Cervical back: Normal range of motion.   Lymphadenopathy:      Cervical: No cervical adenopathy.   Skin:     Findings: No rash.   Neurological:      General: No focal deficit present.      Mental Status: She is alert.   Psychiatric:         Behavior: Behavior normal.         Thought Content: Thought content normal.         Judgment: Judgment normal.         Lab Results   Component Value Date    WBC 7.2 10/19/2021    HGB 12.8 10/19/2021    HCT 38.5 10/19/2021     10/19/2021    CHOL 183 10/19/2021    TRIG 86 10/19/2021    HDL 56 10/19/2021    ALT 18 10/19/2021    AST 21 10/19/2021     10/19/2021    K 5.1 10/19/2021     10/19/2021    CREATININE 0.74 10/19/2021    BUN 14 10/19/2021    CO2 26 10/19/2021    TSH 3.280 10/19/2021    HGBA1C 5.9 (H) 10/19/2021      LDL  111    Problem List Items Addressed This Visit     Lichen planus follicularis     Seeing dermatologist for scalp inflammatory response and trying topical steroid to see if she gets hair regrowth.  She will continue follow-up.  No scarring and now no active inflammation.         Essential hypertension - Primary     Well-controlled and will continue the same medication.         Cardiac murmur     Last echo 2019, normal.  Exam discordant. Will see Dr Loving for evaluation.   She will be seeing him for venous insufficiency evaluation and will address the murmur during the vascular evaluation as well and she may need a follow-up echo.           Other Visit Diagnoses     Need for influenza vaccination        Relevant Orders    Influenza vaccine quadrivalent preservative free 6 mon and older IM (FluLaval) (Completed)      Again discussed colonoscopy    Return in about 1 year (around 10/26/2022) for Physical exam.          Sue Thakkar MD  [facility]  Family Medicine in [address]  [place]  [address]  P:   F: [phone]

## 2021-10-27 PROBLEM — I83.811 VARICOSE VEINS OF RIGHT LOWER EXTREMITY WITH PAIN: Status: ACTIVE | Noted: 2021-10-27

## 2021-10-27 PROBLEM — Z53.20 COLONOSCOPY REFUSED: Status: ACTIVE | Noted: 2021-10-27

## 2021-10-27 ASSESSMENT — ENCOUNTER SYMPTOMS
DIFFICULTY URINATING: 0
ADENOPATHY: 0

## 2021-10-27 NOTE — ASSESSMENT & PLAN NOTE
Seeing dermatologist for scalp inflammatory response and trying topical steroid to see if she gets hair regrowth.  She will continue follow-up.  No scarring and now no active inflammation.

## 2022-01-27 ENCOUNTER — TELEPHONE (OUTPATIENT)
Dept: FAMILY MEDICINE | Facility: CLINIC | Age: 62
End: 2022-01-27
Payer: COMMERCIAL

## 2022-01-27 DIAGNOSIS — R92.8 ABNORMAL MAMMOGRAM: Primary | ICD-10-CM

## 2022-01-27 NOTE — TELEPHONE ENCOUNTER
Patient called stating that she is due for a 6 month f/u mammogram. Patient needs a script written so that she can schedule the mammogram appointment. Please advise

## 2022-02-18 ENCOUNTER — TELEPHONE (OUTPATIENT)
Dept: FAMILY MEDICINE | Facility: CLINIC | Age: 62
End: 2022-02-18
Payer: COMMERCIAL

## 2022-02-18 NOTE — TELEPHONE ENCOUNTER
Confirmation - Referral [id number]  Effective: 02/18/2022     Expires: 05/19/2022  Active  Referred From  [facility] in [place]  Specialty: Family Practice  Group NPI: [id number]  Provider ID: [id number]  Tax ID: [id number]  [address] [place] [address]  Referred To  [facility]  Specialty:  Tier 2  Group NPI: [id number]  Provider ID: [id number]  Tax ID: [id number]  Individual Provider NPI: [id number]  Provider Name: KVNG HUANG MD  This referral is valid at any location for the above group  Patient Info  JHONATAN HAYES  [id number]  Female  1960  [address] [place] [address]  Clinical Information  Place of Service  Office  Service Type  Medical Care  Diagnosis  1R68.89 - other general symptoms and signs  Procedures  199499 - unlisted evaluation and management service  Disclaimer  [employer] and its Affiliates ([facility]) will pay for only those services covered under the [facility] Contract which are specifically noted and requested by the PCP or OBGYN on the referral. If any additional services, testing, or follow-up care are required, the PCP or OBGYN must be contacted prior to the delivery of such additional services for written approval on a separate referral. Non-referred services will not be covered by [facility]. Benefits are underwritten or administered by [employer], a subsidiary of [employer], which are independent licensees of the Blue Cross and Blue Shield Association.

## 2022-02-24 DIAGNOSIS — I10 ESSENTIAL HYPERTENSION: ICD-10-CM

## 2022-02-24 RX ORDER — LISINOPRIL 10 MG/1
TABLET ORAL
Qty: 90 TABLET | Refills: 1 | Status: SHIPPED | OUTPATIENT
Start: 2022-02-24 | End: 2022-02-24 | Stop reason: SDUPTHER

## 2022-02-24 RX ORDER — LISINOPRIL 10 MG/1
10 TABLET ORAL
Qty: 90 TABLET | Refills: 2 | Status: SHIPPED | OUTPATIENT
Start: 2022-02-24 | End: 2022-12-02

## 2022-02-24 NOTE — TELEPHONE ENCOUNTER
Pt is requesting refill be sent to pharmacy for:    lisinopriL (PRINIVIL) 10 mg tablet  (90 quantity)    [facility] [place] [address] [place]

## 2022-03-10 ENCOUNTER — TELEPHONE (OUTPATIENT)
Dept: FAMILY MEDICINE | Facility: CLINIC | Age: 62
End: 2022-03-10
Payer: COMMERCIAL

## 2022-03-10 NOTE — TELEPHONE ENCOUNTER
Confirmation - Referral [id number]  Effective: 03/10/2022     Expires: 06/08/2022  Active  Referred From  [facility] in [place]  Specialty: Family Practice  Group NPI: [id number]  Provider ID: [id number]  Tax ID: [id number]  [address] [place] [address]  Referred To  [facility]  Specialty:  Tier 2  Group NPI: [id number]  Provider ID: [id number]  Tax ID: [id number]  This referral is valid at any location for the above group  Patient Info  JHONATAN HAYES  [id number]  Female  1960  [address] [place] [address]  Clinical Information  Place of Service  Office  Service Type  Medical Care  Diagnosis  1R68.89 - other general symptoms and signs  Procedures  199499 - unlisted evaluation and management service  Disclaimer  [employer] and [employer] ([facility]) will pay for only those services covered under the [facility] Contract which are specifically noted and requested by the PCP or OBGYN on the referral. If any additional services, testing, or follow-up care are required, the PCP or OBGYN must be contacted prior to the delivery of such additional services for written approval on a separate referral. Non-referred services will not be covered by [facility]. Benefits are underwritten or administered by [employer], a subsidiary of [employer], which are independent licensees of the [employer].

## 2022-03-24 ENCOUNTER — TELEPHONE (OUTPATIENT)
Dept: FAMILY MEDICINE | Facility: CLINIC | Age: 62
End: 2022-03-24
Payer: COMMERCIAL

## 2022-03-24 NOTE — TELEPHONE ENCOUNTER
Pt is calling to f/u on two referrals that need referrals. She stated Alpa had said that everything was good to go for her mammo and US that she has scheduled tomorrow AM (3/25) downstairs.    I did not see the referral info in pt's chart.    Pls f/u w/ pt when done.

## 2022-03-24 NOTE — TELEPHONE ENCOUNTER
Referral request completed for both the Mammo and US     Confirmation - Referral [id number]  Effective: 03/24/2022     Expires: 06/22/2022  Active  Referred From  [facility] in [place]  Specialty: Family Practice  Group NPI: [id number]  Provider ID: [id number]  Tax ID: [id number]  [address] [place] [address]  Referred To  [facility]  Specialty:  Tier 2  Group NPI: [id number]  Provider ID: [id number]  Tax ID: [id number]  This referral is valid at any location for the above group  Patient Info  JHONATAN HAYES  [id number]  Female  1960  [address] [place] [address]  Clinical Information  Place of Service  [facility]  Service Type  Medical Care  Diagnosis  1R92.8 - other abnormal and inconclusive findings on diagnostic imaging of breast  Procedures  475138 - ultrasound, breast, unilateral, real time with image documentation, including axilla when performed; limited  725268 - diagnostic mammography, including computer-aided detection (cad) when performed; bilateral

## 2022-03-25 ENCOUNTER — HOSPITAL ENCOUNTER (OUTPATIENT)
Dept: RADIOLOGY | Age: 62
Discharge: HOME | End: 2022-03-25
Attending: FAMILY MEDICINE
Payer: COMMERCIAL

## 2022-03-25 DIAGNOSIS — R92.8 ABNORMAL MAMMOGRAM: ICD-10-CM

## 2022-03-25 PROCEDURE — G0279 TOMOSYNTHESIS, MAMMO: HCPCS

## 2022-03-25 PROCEDURE — 76642 ULTRASOUND BREAST LIMITED: CPT | Mod: 50

## 2022-03-28 ENCOUNTER — TELEPHONE (OUTPATIENT)
Dept: FAMILY MEDICINE | Facility: CLINIC | Age: 62
End: 2022-03-28
Payer: COMMERCIAL

## 2022-03-28 NOTE — TELEPHONE ENCOUNTER
Please advise right mammogram looks stable along with ultrasound.  There are benign-appearing findings.  The left breast imaging was normal.  If she has any persisting left breast symptoms, we can refer her to a breast specialist for an evaluation since mammograms are not perfect.  Please see if she has any persisting symptoms and we can send her for an evaluation.

## 2022-03-28 NOTE — TELEPHONE ENCOUNTER
Spoke with pt and advised of imaging results. She does continue to have intermittent twinge of discomfort in left breast. Who do you recommend for breast specialist?

## 2022-10-03 ENCOUNTER — OFFICE VISIT (OUTPATIENT)
Dept: FAMILY MEDICINE | Facility: CLINIC | Age: 62
End: 2022-10-03
Payer: COMMERCIAL

## 2022-10-03 ENCOUNTER — HOSPITAL ENCOUNTER (OUTPATIENT)
Dept: RADIOLOGY | Age: 62
Discharge: HOME | End: 2022-10-03
Attending: NURSE PRACTITIONER
Payer: COMMERCIAL

## 2022-10-03 VITALS
OXYGEN SATURATION: 98 % | HEART RATE: 70 BPM | RESPIRATION RATE: 16 BRPM | TEMPERATURE: 98.3 F | BODY MASS INDEX: 33.31 KG/M2 | DIASTOLIC BLOOD PRESSURE: 84 MMHG | SYSTOLIC BLOOD PRESSURE: 126 MMHG | WEIGHT: 188 LBS | HEIGHT: 63 IN

## 2022-10-03 DIAGNOSIS — M79.672 LEFT FOOT PAIN: Primary | ICD-10-CM

## 2022-10-03 DIAGNOSIS — M79.672 LEFT FOOT PAIN: ICD-10-CM

## 2022-10-03 PROCEDURE — 73630 X-RAY EXAM OF FOOT: CPT | Mod: LT

## 2022-10-03 PROCEDURE — 3079F DIAST BP 80-89 MM HG: CPT | Performed by: NURSE PRACTITIONER

## 2022-10-03 PROCEDURE — 3074F SYST BP LT 130 MM HG: CPT | Performed by: NURSE PRACTITIONER

## 2022-10-03 PROCEDURE — 99213 OFFICE O/P EST LOW 20 MIN: CPT | Performed by: NURSE PRACTITIONER

## 2022-10-03 PROCEDURE — 3008F BODY MASS INDEX DOCD: CPT | Performed by: NURSE PRACTITIONER

## 2022-10-03 ASSESSMENT — ENCOUNTER SYMPTOMS
FACIAL ASYMMETRY: 0
COUGH: 0
APPETITE CHANGE: 0
LIGHT-HEADEDNESS: 0
LOSS OF SENSATION: 0
LOSS OF MOTION: 0
MUSCULOSKELETAL NEGATIVE: 1
DIZZINESS: 0
INABILITY TO BEAR WEIGHT: 0
VOMITING: 0
SHORTNESS OF BREATH: 0
CONSTIPATION: 0
CHILLS: 0
ALLERGIC/IMMUNOLOGIC NEGATIVE: 1
TINGLING: 0
DIARRHEA: 0
EYES NEGATIVE: 1
HEMATOLOGIC/LYMPHATIC NEGATIVE: 1
FEVER: 0
PSYCHIATRIC NEGATIVE: 1
ENDOCRINE NEGATIVE: 1

## 2022-10-03 NOTE — PROGRESS NOTES
[facility]  [address] [place] [address]  [phone]     Reason for visit:   Chief Complaint   Patient presents with    Foot Pain     L foot pain x 1 month. Noticed bruises on top of foot, but denies any injury. Not OTCs taken.       HPI   Leny Burton is a 61 y.o. female who presents with left foot pain   Foot Injury   Incident onset: 2 month. Incident location: noticed 2 bruises. There was no injury mechanism. Pain course: mostly at night and when getting up off floor  Pertinent negatives include no inability to bear weight, loss of motion, loss of sensation or tingling. Associated symptoms comments: + swelling . Nothing aggravates the symptoms. She has tried nothing for the symptoms.           Medical History:  Past Medical History:   Diagnosis Date    Anxiety 1/17/2019    Cardiac murmur 1/17/2019    Essential hypertension 1/17/2019    History of knee replacement, total, bilateral 3/31/2021    Hypertension     Obesity due to excess calories 1/17/2019    Osteoarthritis of knee 1/17/2019       Surgical History:  Past Surgical History:   Procedure Laterality Date    HERNIA REPAIR      MOUTH SURGERY      REPLACEMENT TOTAL KNEE Left 2019    REPLACEMENT TOTAL KNEE Right 09/2020       Social History:  Social History     Social History Narrative    [employer]        Family History:  Family History   Problem Relation Age of Onset    Hypertension Biological Mother     Thyroid disease Biological Sister     Hypertension Biological Brother     Thyroid disease Biological Brother     Hypertension Maternal Grandfather     Breast cancer Mother's Sister     Testicular cancer Cousin     Colon cancer Biological Father        Allergies:  Penicillins    Current Medications:  Current Outpatient Medications   Medication Sig Dispense Refill    biotin 5 mg capsule Take by mouth daily.      lisinopriL (PRINIVIL) 10 mg tablet Take 1 tablet (10 mg total) by mouth once daily. 90  tablet 2     No current facility-administered medications for this visit.       Review of Systems:  Review of Systems   Constitutional: Negative for appetite change, chills and fever.   HENT: Negative.    Eyes: Negative.    Respiratory: Negative for cough and shortness of breath.    Cardiovascular: Negative for chest pain.   Gastrointestinal: Negative for constipation, diarrhea and vomiting.   Endocrine: Negative.    Genitourinary: Negative.    Musculoskeletal: Negative.    Skin: Negative.    Allergic/Immunologic: Negative.    Neurological: Negative for dizziness, tingling, facial asymmetry and light-headedness.   Hematological: Negative.    Psychiatric/Behavioral: Negative.        Objective     Vital Signs:  Vitals:    10/03/22 1116   BP: 126/84   Pulse: 70   Resp: 16   Temp: 36.8 °C (98.3 °F)   SpO2: 98%       BMI:  Body mass index is 33.3 kg/m².     Physical Exam  Constitutional:       Appearance: She is well-developed.   HENT:      Head: Normocephalic and atraumatic.   Eyes:      Pupils: Pupils are equal, round, and reactive to light.   Neck:      Thyroid: No thyroid mass, thyromegaly or thyroid tenderness.   Cardiovascular:      Rate and Rhythm: Normal rate and regular rhythm.      Heart sounds: Normal heart sounds. No murmur heard.  Pulmonary:      Effort: Pulmonary effort is normal. No respiratory distress.      Breath sounds: Normal breath sounds. No wheezing.   Abdominal:      General: Bowel sounds are normal. There is no distension.      Palpations: Abdomen is soft.      Tenderness: There is no abdominal tenderness.   Musculoskeletal:      Cervical back: Normal range of motion.      Left foot: Decreased range of motion. Normal capillary refill. Swelling present. No deformity, bunion, Charcot foot, foot drop, prominent metatarsal heads, laceration, tenderness, bony tenderness or crepitus. Normal pulse.   Skin:     General: Skin is warm and dry.      Coloration: Skin is not pale.      Findings: No erythema.    Neurological:      Mental Status: She is alert and oriented to person, place, and time.   Psychiatric:         Behavior: Behavior normal.         Thought Content: Thought content normal.         Judgment: Judgment normal.         Recent labs before today:     Lab Results   Component Value Date    WBC 7.2 10/19/2021    HGB 12.8 10/19/2021    HCT 38.5 10/19/2021     10/19/2021    CHOL 183 10/19/2021    TRIG 86 10/19/2021    HDL 56 10/19/2021    ALT 18 10/19/2021    AST 21 10/19/2021     10/19/2021    K 5.1 10/19/2021     10/19/2021    CREATININE 0.74 10/19/2021    BUN 14 10/19/2021    CO2 26 10/19/2021    TSH 3.280 10/19/2021    HGBA1C 5.9 (H) 10/19/2021        Procedures   Assessment/Plan   Problem List Items Addressed This Visit    None     Visit Diagnoses     Left foot pain    -  Primary    Relevant Orders    X-RAY FOOT LEFT 3+ VIEWS      Advised we start with an x-ray of the foot advised potentially need a podiatry referral.    There are no Patient Instructions on file for this visit.           ILANA David  10/3/2022

## 2022-10-05 ENCOUNTER — TELEPHONE (OUTPATIENT)
Dept: FAMILY MEDICINE | Facility: CLINIC | Age: 62
End: 2022-10-05
Payer: COMMERCIAL

## 2022-10-05 NOTE — TELEPHONE ENCOUNTER
----- Message from ILANA David sent at 10/5/2022  8:26 AM EDT -----  Please advise patient that x-ray of the foot is negative.  Advise next step would be to see podiatry or orthopedic.

## 2022-10-05 NOTE — TELEPHONE ENCOUNTER
Spoke to pt about results, she said she is still having pain that wakes her up at night, told her to f/u with Ortho, suggested [facility], she was agreeable.

## 2022-10-05 NOTE — RESULT ENCOUNTER NOTE
Please advise patient that x-ray of the foot is negative.  Advise next step would be to see podiatry or orthopedic.

## 2022-11-03 ENCOUNTER — TELEPHONE (OUTPATIENT)
Dept: FAMILY MEDICINE | Facility: CLINIC | Age: 62
End: 2022-11-03
Payer: COMMERCIAL

## 2022-11-03 NOTE — TELEPHONE ENCOUNTER
Confirmation - Referral [id number]  Effective: 11/03/2022     Expires: 02/01/2023  Active  Referred From  [facility] in [place]  Specialty: Family Practice  Group NPI: [id number]  Provider ID: [id number]  Tax ID: [id number]  [address] [place] [address]  Referred To  [facility]  Specialty:  Tier 2  Group NPI: [id number]  Provider ID: [id number]  Tax ID: [id number]  This referral is valid at any location for the above group  Patient Info  JHONATAN HAYES  [id number]  Female  1960  [address]  [place] [address]  Clinical Information  Place of Service  Office  Service Type  Medical Care  Diagnosis  1H25.093 - other age-related incipient cataract, bilateral  Procedures  199499 - unlisted evaluation and management service

## 2022-12-27 ENCOUNTER — TELEPHONE (OUTPATIENT)
Dept: FAMILY MEDICINE | Facility: CLINIC | Age: 62
End: 2022-12-27

## 2022-12-27 NOTE — TELEPHONE ENCOUNTER
Leny called because she Is visiting family in [place] and took a fall onto concrete yisel in garage and has a large contusion on forehead, both eyes are black and blue and the swelling and color is taveling down her face and her nose has a cut on it.  She was able to get treatment from her son who has some training but he advised to schedule with her PCP when she returns. Please call patient back to schedule or if she can see the CRNP.

## 2022-12-27 NOTE — TELEPHONE ENCOUNTER
Pt is scheduled 1/5 23 with Alexy Soares.   Lamberto- make sure to search the schedule for other providers if patients pcp does not have availability.

## 2023-01-04 DIAGNOSIS — I10 ESSENTIAL HYPERTENSION: ICD-10-CM

## 2023-01-04 RX ORDER — LISINOPRIL 10 MG/1
TABLET ORAL
Qty: 90 TABLET | Refills: 0 | Status: SHIPPED | OUTPATIENT
Start: 2023-01-04 | End: 2023-04-06

## 2023-01-05 ENCOUNTER — OFFICE VISIT (OUTPATIENT)
Dept: FAMILY MEDICINE | Facility: CLINIC | Age: 63
End: 2023-01-05
Payer: COMMERCIAL

## 2023-01-05 VITALS
RESPIRATION RATE: 18 BRPM | TEMPERATURE: 97.5 F | HEART RATE: 83 BPM | SYSTOLIC BLOOD PRESSURE: 124 MMHG | WEIGHT: 185 LBS | BODY MASS INDEX: 32.78 KG/M2 | OXYGEN SATURATION: 97 % | DIASTOLIC BLOOD PRESSURE: 82 MMHG | HEIGHT: 63 IN

## 2023-01-05 DIAGNOSIS — S06.0X0A CONCUSSION WITHOUT LOSS OF CONSCIOUSNESS, INITIAL ENCOUNTER: Primary | ICD-10-CM

## 2023-01-05 DIAGNOSIS — S09.90XA INJURY OF HEAD, INITIAL ENCOUNTER: ICD-10-CM

## 2023-01-05 PROCEDURE — 3074F SYST BP LT 130 MM HG: CPT | Performed by: NURSE PRACTITIONER

## 2023-01-05 PROCEDURE — 99214 OFFICE O/P EST MOD 30 MIN: CPT | Performed by: NURSE PRACTITIONER

## 2023-01-05 PROCEDURE — 3008F BODY MASS INDEX DOCD: CPT | Performed by: NURSE PRACTITIONER

## 2023-01-05 PROCEDURE — 3079F DIAST BP 80-89 MM HG: CPT | Performed by: NURSE PRACTITIONER

## 2023-01-05 ASSESSMENT — ENCOUNTER SYMPTOMS
HEADACHES: 1
WHEEZING: 0
FATIGUE: 1
ABDOMINAL PAIN: 0
CHILLS: 0
NAUSEA: 1
COUGH: 0
SHORTNESS OF BREATH: 0
LIGHT-HEADEDNESS: 1
VOMITING: 0
FEVER: 0

## 2023-01-05 NOTE — PATIENT INSTRUCTIONS
1) Head injury, concern for concussion, refer to concussion program    2) Concern for remote possibility of LeFort fracture, no tenderness to palpation over the nasal and suboccipital areas despite bruising, which is encouraging.CT facial bones. Avoid NSAIDS.     3) Follow up based on results
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
## 2023-01-17 ENCOUNTER — HOSPITAL ENCOUNTER (OUTPATIENT)
Dept: RADIOLOGY | Age: 63
Discharge: HOME | End: 2023-01-17
Attending: NURSE PRACTITIONER
Payer: COMMERCIAL

## 2023-01-17 DIAGNOSIS — S09.90XA INJURY OF HEAD, INITIAL ENCOUNTER: ICD-10-CM

## 2023-01-17 PROCEDURE — G1004 CDSM NDSC: HCPCS

## 2023-01-20 NOTE — TELEPHONE ENCOUNTER
[facility] # [phone]    Requesting callback to discuss recent CT Sinus results .

## 2023-01-31 ENCOUNTER — HOSPITAL ENCOUNTER (OUTPATIENT)
Dept: RADIOLOGY | Age: 63
Discharge: HOME | End: 2023-01-31
Attending: FAMILY MEDICINE
Payer: COMMERCIAL

## 2023-01-31 ENCOUNTER — OFFICE VISIT (OUTPATIENT)
Dept: FAMILY MEDICINE | Facility: CLINIC | Age: 63
End: 2023-01-31
Payer: COMMERCIAL

## 2023-01-31 ENCOUNTER — TELEPHONE (OUTPATIENT)
Dept: FAMILY MEDICINE | Facility: CLINIC | Age: 63
End: 2023-01-31

## 2023-01-31 VITALS
RESPIRATION RATE: 16 BRPM | SYSTOLIC BLOOD PRESSURE: 110 MMHG | TEMPERATURE: 97.7 F | HEIGHT: 63 IN | WEIGHT: 184 LBS | BODY MASS INDEX: 32.6 KG/M2 | DIASTOLIC BLOOD PRESSURE: 70 MMHG | HEART RATE: 83 BPM | OXYGEN SATURATION: 98 %

## 2023-01-31 DIAGNOSIS — M25.571 ACUTE RIGHT ANKLE PAIN: ICD-10-CM

## 2023-01-31 DIAGNOSIS — I10 ESSENTIAL HYPERTENSION: ICD-10-CM

## 2023-01-31 DIAGNOSIS — E66.811 CLASS 1 OBESITY DUE TO EXCESS CALORIES WITH SERIOUS COMORBIDITY AND BODY MASS INDEX (BMI) OF 32.0 TO 32.9 IN ADULT: ICD-10-CM

## 2023-01-31 DIAGNOSIS — E66.09 CLASS 1 OBESITY DUE TO EXCESS CALORIES WITH SERIOUS COMORBIDITY AND BODY MASS INDEX (BMI) OF 32.0 TO 32.9 IN ADULT: ICD-10-CM

## 2023-01-31 DIAGNOSIS — M25.571 ACUTE RIGHT ANKLE PAIN: Primary | ICD-10-CM

## 2023-01-31 DIAGNOSIS — M25.462 SWELLING OF JOINT OF LEFT KNEE: ICD-10-CM

## 2023-01-31 DIAGNOSIS — S43.101S: Primary | ICD-10-CM

## 2023-01-31 DIAGNOSIS — S06.0X0D CONCUSSION WITHOUT LOSS OF CONSCIOUSNESS, SUBSEQUENT ENCOUNTER: ICD-10-CM

## 2023-01-31 DIAGNOSIS — S00.83XA CONTUSION OF FOREHEAD, INITIAL ENCOUNTER: ICD-10-CM

## 2023-01-31 PROCEDURE — 73610 X-RAY EXAM OF ANKLE: CPT | Mod: RT

## 2023-01-31 PROCEDURE — 3078F DIAST BP <80 MM HG: CPT | Performed by: FAMILY MEDICINE

## 2023-01-31 PROCEDURE — 3008F BODY MASS INDEX DOCD: CPT | Performed by: FAMILY MEDICINE

## 2023-01-31 PROCEDURE — 99214 OFFICE O/P EST MOD 30 MIN: CPT | Performed by: FAMILY MEDICINE

## 2023-01-31 PROCEDURE — 73564 X-RAY EXAM KNEE 4 OR MORE: CPT | Mod: LT

## 2023-01-31 PROCEDURE — 3074F SYST BP LT 130 MM HG: CPT | Performed by: FAMILY MEDICINE

## 2023-01-31 NOTE — TELEPHONE ENCOUNTER
Please advise knee x-ray did not show any problem with her prosthesis and no other acute abnormality.    The ankle x-ray was a bit more complex and that she may have had a prior fracture of that lateral part of the ankle in the past, and it may be difficult to see an acute fracture there.  Since she does have symptoms in that location, CT scan of the ankle is recommended without contrast.  Order placed and please ask her to schedule this, along with a CT of the shoulder.

## 2023-01-31 NOTE — PROGRESS NOTES
Chief Complaint  Chief Complaint   Patient presents with   • Annual Exam     Pt here for PE and states that she had a fall last month and had to get CT done        History of Present Illness  63 yo female. Originally scheduled for PE but many concerns about recent fall.   Had a fall and hit face and had CT scan with no fractures, and brain CT was ok. Just had a forehead skin hematoma.   Has not yet had concussion evaluation and waiting for callback to schedule.   Has now noted right collarbone is higher than after prior collarbone dislocation and always had a protrusion over past 20 yrs.  Now feels it may be a little higher.   No pain there.   Has numbness or tingling in arm.   Did think her  is decreased a little since the fall. No neck pain. No other weakness.   Also since the injury has had pain in lateral right ankle and started over the following week to have pain swelling.   Also had left knee swelling and that resolved. Hx of knee replacement there.             Current Outpatient Medications   Medication Sig Dispense Refill   • lisinopriL (PRINIVIL) 10 mg tablet TAKE 1 TABLET BY MOUTH EVERY DAY 90 tablet 0   • multivitamin tablet Take by mouth daily.     • biotin 5 mg capsule Take by mouth daily.       No current facility-administered medications for this visit.       Patient Active Problem List   Diagnosis   • Essential hypertension   • Cardiac murmur   • Anxiety   • Obesity due to excess calories   • History of knee replacement, total, bilateral   • Lichen planus follicularis   • Varicose veins of right lower extremity with pain   • Colonoscopy refused       Past Medical History:   Diagnosis Date   • Anxiety 1/17/2019   • Cardiac murmur 1/17/2019   • Essential hypertension 1/17/2019   • History of knee replacement, total, bilateral 3/31/2021   • Hypertension    • Obesity due to excess calories 1/17/2019   • Osteoarthritis of knee 1/17/2019       Past Surgical History:   Procedure Laterality Date   •  "HERNIA REPAIR     • MOUTH SURGERY     • REPLACEMENT TOTAL KNEE Left 2019   • REPLACEMENT TOTAL KNEE Right 09/2020       Family History   Problem Relation Age of Onset   • Hypertension Biological Mother    • Thyroid disease Biological Sister    • Hypertension Biological Brother    • Thyroid disease Biological Brother    • Hypertension Maternal Grandfather    • Breast cancer Mother's Sister    • Testicular cancer Cousin    • Colon cancer Biological Father        Social History     Socioeconomic History   • Marital status:      Spouse name: Not on file   • Number of children: 2   • Years of education: Not on file   • Highest education level: Not on file   Occupational History   • Not on file   Tobacco Use   • Smoking status: Never   • Smokeless tobacco: Never   Substance and Sexual Activity   • Alcohol use: Yes     Comment: occass   • Drug use: No   • Sexual activity: Defer   Other Topics Concern   • Not on file   Social History Narrative    [employer]      Social Determinants of Health     Financial Resource Strain: Not on file   Food Insecurity: Not on file   Transportation Needs: Not on file   Physical Activity: Not on file   Stress: Not on file   Social Connections: Not on file   Intimate Partner Violence: Not on file   Housing Stability: Not on file       Allergies   Allergen Reactions   • Penicillins      Yeast infection       Review of Systems   Constitutional:        As in HPI       Vitals:    01/31/23 1356   BP: 110/70   BP Location: Right upper arm   Patient Position: Sitting   Pulse: 83   Resp: 16   Temp: 36.5 °C (97.7 °F)   TempSrc: Temporal   SpO2: 98%   Weight: 83.5 kg (184 lb)   Height: 1.6 m (5' 3\")     Body mass index is 32.59 kg/m².    Physical Exam    Problem List Items Addressed This Visit     Obesity due to excess calories    Essential hypertension    Relevant Orders    Comprehensive metabolic panel    CBC and Differential    Lipid Prof w Refl    Hemoglobin A1c    TSH w reflex FT4 "   Other Visit Diagnoses     Closed dislocation of right clavicle, sequela    -  Primary    Relevant Orders    CT SHOULDER RIGHT WITHOUT IV CONTRAST    Contusion of forehead, initial encounter        Acute right ankle pain        Relevant Orders    X-RAY ANKLE RIGHT 3+ VIEWS    Ankle splint air cast    Concussion without loss of consciousness, subsequent encounter        Swelling of joint of left knee        Relevant Orders    X-RAY KNEE LEFT 4+ VIEWS          Return in about 9 days (around 2/9/2023) for Todays visit was problem visit, reschedule PE.          Sue Thakkar MD  [facility]  Family Medicine in [place]  [address],  [address]  [place] [address]  P: [phone]  F: [phone]

## 2023-02-06 ENCOUNTER — TELEPHONE (OUTPATIENT)
Dept: FAMILY MEDICINE | Facility: CLINIC | Age: 63
End: 2023-02-06
Payer: COMMERCIAL

## 2023-02-06 NOTE — TELEPHONE ENCOUNTER
Received denial from [facility] for CT of shoulder w/o contrast (CPT 07941) due to other tests being able to help in the patient's situation. Peer to peer can be done at [phone] with Case ID [id number].    Received denial from [facility] for CT of ankle w/o contrast (CPT 63605) because according to the notes it does not show that this test will change the way the doctor will treat the patient. Peer to peer can be done at [phone] with Case ID [id number].    Please advise on next steps.    Thank you!    Alana RADFORD

## 2023-02-06 NOTE — TELEPHONE ENCOUNTER
Please let patient know that her insurance company denied coverage for the 2 CT scans that were ordered.  I would suggest that she sees an orthopedist and have them evaluate the clavicle area that seems more prominent and the ankle  pain.  They can then decide what might be the most appropriate imaging and then see if they can get authorization.  Refer to [facility].

## 2023-02-06 NOTE — TELEPHONE ENCOUNTER
Spoke with pt and advised of CT denial. She will follow up with ortho, contact info provided for . She is aware to proceed with lab work

## 2023-02-21 ENCOUNTER — OFFICE VISIT (OUTPATIENT)
Dept: FAMILY MEDICINE | Facility: CLINIC | Age: 63
End: 2023-02-21
Payer: COMMERCIAL

## 2023-02-21 VITALS
OXYGEN SATURATION: 99 % | HEIGHT: 63 IN | HEART RATE: 61 BPM | SYSTOLIC BLOOD PRESSURE: 132 MMHG | RESPIRATION RATE: 14 BRPM | BODY MASS INDEX: 33.06 KG/M2 | WEIGHT: 186.6 LBS | DIASTOLIC BLOOD PRESSURE: 78 MMHG | TEMPERATURE: 97.8 F

## 2023-02-21 DIAGNOSIS — Z12.11 COLON CANCER SCREENING: ICD-10-CM

## 2023-02-21 DIAGNOSIS — E66.09 CLASS 1 OBESITY DUE TO EXCESS CALORIES WITH SERIOUS COMORBIDITY AND BODY MASS INDEX (BMI) OF 32.0 TO 32.9 IN ADULT: ICD-10-CM

## 2023-02-21 DIAGNOSIS — R01.1 CARDIAC MURMUR: ICD-10-CM

## 2023-02-21 DIAGNOSIS — I10 ESSENTIAL HYPERTENSION: ICD-10-CM

## 2023-02-21 DIAGNOSIS — Z00.00 ENCOUNTER FOR GENERAL ADULT MEDICAL EXAMINATION WITHOUT ABNORMAL FINDINGS: Primary | ICD-10-CM

## 2023-02-21 DIAGNOSIS — E66.811 CLASS 1 OBESITY DUE TO EXCESS CALORIES WITH SERIOUS COMORBIDITY AND BODY MASS INDEX (BMI) OF 32.0 TO 32.9 IN ADULT: ICD-10-CM

## 2023-02-21 DIAGNOSIS — R92.8 ABNORMAL MAMMOGRAM: ICD-10-CM

## 2023-02-21 DIAGNOSIS — Z23 ENCOUNTER FOR IMMUNIZATION: ICD-10-CM

## 2023-02-21 PROCEDURE — 90750 HZV VACC RECOMBINANT IM: CPT | Performed by: FAMILY MEDICINE

## 2023-02-21 PROCEDURE — 90471 IMMUNIZATION ADMIN: CPT | Performed by: FAMILY MEDICINE

## 2023-02-21 PROCEDURE — 3075F SYST BP GE 130 - 139MM HG: CPT | Performed by: FAMILY MEDICINE

## 2023-02-21 PROCEDURE — 3078F DIAST BP <80 MM HG: CPT | Performed by: FAMILY MEDICINE

## 2023-02-21 PROCEDURE — 3008F BODY MASS INDEX DOCD: CPT | Performed by: FAMILY MEDICINE

## 2023-02-21 PROCEDURE — 99396 PREV VISIT EST AGE 40-64: CPT | Mod: 25 | Performed by: FAMILY MEDICINE

## 2023-02-21 ASSESSMENT — ENCOUNTER SYMPTOMS
WEAKNESS: 0
DYSPHORIC MOOD: 0
UNEXPECTED WEIGHT CHANGE: 0
DIFFICULTY URINATING: 0
ABDOMINAL PAIN: 0
JOINT SWELLING: 0
SHORTNESS OF BREATH: 0
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
## 2023-02-22 NOTE — ASSESSMENT & PLAN NOTE
She had a normal echocardiogram in 2019, but does have a significant murmur, I would like her to have a follow-up echocardiogram and likely cardiology follow-up.  I had referred her previously to Dr Loving and she did not pursue.

## 2023-02-26 ENCOUNTER — TELEPHONE (OUTPATIENT)
Dept: FAMILY MEDICINE | Facility: CLINIC | Age: 63
End: 2023-02-26
Payer: COMMERCIAL

## 2023-02-26 LAB
ALBUMIN SERPL-MCNC: 4.1 G/DL (ref 3.8–4.8)
ALBUMIN/GLOB SERPL: 1.5 {RATIO} (ref 1.2–2.2)
ALP SERPL-CCNC: 99 IU/L (ref 44–121)
ALT SERPL-CCNC: 18 IU/L (ref 0–32)
AST SERPL-CCNC: 21 IU/L (ref 0–40)
BASOPHILS # BLD AUTO: 0.1 X10E3/UL (ref 0–0.2)
BASOPHILS NFR BLD AUTO: 1 %
BILIRUB SERPL-MCNC: 0.3 MG/DL (ref 0–1.2)
BUN SERPL-MCNC: 16 MG/DL (ref 8–27)
BUN/CREAT SERPL: 22 (ref 12–28)
CALCIUM SERPL-MCNC: 9.5 MG/DL (ref 8.7–10.3)
CHLORIDE SERPL-SCNC: 105 MMOL/L (ref 96–106)
CHOLEST SERPL-MCNC: 193 MG/DL (ref 100–199)
CO2 SERPL-SCNC: 25 MMOL/L (ref 20–29)
CREAT SERPL-MCNC: 0.74 MG/DL (ref 0.57–1)
EGFRCR SERPLBLD CKD-EPI 2021: 91 ML/MIN/1.73
EOSINOPHIL # BLD AUTO: 0.2 X10E3/UL (ref 0–0.4)
EOSINOPHIL NFR BLD AUTO: 4 %
ERYTHROCYTE [DISTWIDTH] IN BLOOD BY AUTOMATED COUNT: 13 % (ref 11.7–15.4)
GLOBULIN SER CALC-MCNC: 2.7 G/DL (ref 1.5–4.5)
GLUCOSE SERPL-MCNC: 94 MG/DL (ref 70–99)
HBA1C MFR BLD: 5.6 % (ref 4.8–5.6)
HCT VFR BLD AUTO: 40.5 % (ref 34–46.6)
HDLC SERPL-MCNC: 60 MG/DL
HGB BLD-MCNC: 13.7 G/DL (ref 11.1–15.9)
IMM GRANULOCYTES # BLD AUTO: 0 X10E3/UL (ref 0–0.1)
IMM GRANULOCYTES NFR BLD AUTO: 0 %
LDLC SERPL CALC-MCNC: 113 MG/DL (ref 0–99)
LDLC/HDLC SERPL: 1.9 RATIO (ref 0–3.2)
LYMPHOCYTES # BLD AUTO: 1.2 X10E3/UL (ref 0.7–3.1)
LYMPHOCYTES NFR BLD AUTO: 18 %
MCH RBC QN AUTO: 28.7 PG (ref 26.6–33)
MCHC RBC AUTO-ENTMCNC: 33.8 G/DL (ref 31.5–35.7)
MCV RBC AUTO: 85 FL (ref 79–97)
MONOCYTES # BLD AUTO: 0.8 X10E3/UL (ref 0.1–0.9)
MONOCYTES NFR BLD AUTO: 11 %
NEUTROPHILS # BLD AUTO: 4.4 X10E3/UL (ref 1.4–7)
NEUTROPHILS NFR BLD AUTO: 66 %
PLATELET # BLD AUTO: 227 X10E3/UL (ref 150–450)
POTASSIUM SERPL-SCNC: 5.2 MMOL/L (ref 3.5–5.2)
PROT SERPL-MCNC: 6.8 G/DL (ref 6–8.5)
RBC # BLD AUTO: 4.78 X10E6/UL (ref 3.77–5.28)
SODIUM SERPL-SCNC: 142 MMOL/L (ref 134–144)
T4 FREE SERPL-MCNC: 1.1 NG/DL (ref 0.82–1.77)
TRIGL SERPL-MCNC: 110 MG/DL (ref 0–149)
TSH SERPL DL<=0.005 MIU/L-ACNC: 2.76 UIU/ML (ref 0.45–4.5)
VLDLC SERPL CALC-MCNC: 20 MG/DL (ref 5–40)
WBC # BLD AUTO: 6.7 X10E3/UL (ref 3.4–10.8)

## 2023-02-26 NOTE — TELEPHONE ENCOUNTER
Please advise labs overall look good with normal thyroid function, blood count, chemistries and pretty good cholesterol profile. The A1c blood sugar number is improved and down to 5.6 again which is great.

## 2023-04-06 DIAGNOSIS — I10 ESSENTIAL HYPERTENSION: ICD-10-CM

## 2023-04-06 RX ORDER — LISINOPRIL 10 MG/1
TABLET ORAL
Qty: 90 TABLET | Refills: 3 | Status: SHIPPED | OUTPATIENT
Start: 2023-04-06 | End: 2024-02-19 | Stop reason: SDUPTHER

## 2023-05-11 ENCOUNTER — TELEPHONE (OUTPATIENT)
Dept: FAMILY MEDICINE | Facility: CLINIC | Age: 63
End: 2023-05-11
Payer: COMMERCIAL

## 2023-05-11 RX ORDER — AMOXICILLIN AND CLAVULANATE POTASSIUM 875; 125 MG/1; MG/1
1 TABLET, FILM COATED ORAL 2 TIMES DAILY
Qty: 14 TABLET | Refills: 0 | Status: SHIPPED | OUTPATIENT
Start: 2023-05-11 | End: 2023-05-18

## 2023-05-11 NOTE — TELEPHONE ENCOUNTER
Spoke with pt and advised of treatment plan. Pt CROW. States she is used to get yeast infections with PCN in the past but is able to tolerate the medication. No true allergy. She does not wish to have diflucan sent to pharm at this time. Rx sent for Augmentin

## 2023-05-11 NOTE — TELEPHONE ENCOUNTER
TC from pt and her physician son. Pt visiting son in [place] and sustained a dog bite today to left forearm and webbing between 1-2 digit of left hand. Fairly superficial puncture wounds. Bleeding controlled. Tdap UTD. Dog UTD with vaccines. Asking if Augmentin can be sent to pharm in [place].

## 2023-05-11 NOTE — TELEPHONE ENCOUNTER
Please send Rx for Augmentin 875 mg, 14 tablets with no refills, 1 twice daily.    We also have note that she has a sensitivity to penicillins and that she is prone to yeast infections with them.  We can also send a prescription for Diflucan 150 mg with 1 tablet with no refills to be taken once if she has onset of any yeast infection symptoms.  Would prefer not to use a different antibiotic as this would be the best to cover for the dog bite.  These confirm that she does not have allergy and that was the only symptom that she had when she took penicillins in the past.

## 2023-05-23 ENCOUNTER — HOSPITAL ENCOUNTER (OUTPATIENT)
Dept: RADIOLOGY | Age: 63
Discharge: HOME | End: 2023-05-23
Attending: RADIOLOGY
Payer: COMMERCIAL

## 2023-05-23 ENCOUNTER — CLINICAL SUPPORT (OUTPATIENT)
Dept: FAMILY MEDICINE | Facility: CLINIC | Age: 63
End: 2023-05-23
Payer: COMMERCIAL

## 2023-05-23 ENCOUNTER — HOSPITAL ENCOUNTER (OUTPATIENT)
Dept: RADIOLOGY | Age: 63
Discharge: HOME | End: 2023-05-23
Attending: FAMILY MEDICINE
Payer: COMMERCIAL

## 2023-05-23 ENCOUNTER — TELEPHONE (OUTPATIENT)
Dept: FAMILY MEDICINE | Facility: CLINIC | Age: 63
End: 2023-05-23

## 2023-05-23 DIAGNOSIS — N64.4 BREAST PAIN, LEFT: ICD-10-CM

## 2023-05-23 DIAGNOSIS — Z23 NEED FOR SHINGLES VACCINE: Primary | ICD-10-CM

## 2023-05-23 DIAGNOSIS — R92.8 ABNORMAL MAMMOGRAM: ICD-10-CM

## 2023-05-23 PROCEDURE — 90750 HZV VACC RECOMBINANT IM: CPT | Performed by: FAMILY MEDICINE

## 2023-05-23 PROCEDURE — 77066 DX MAMMO INCL CAD BI: CPT

## 2023-05-23 PROCEDURE — 90471 IMMUNIZATION ADMIN: CPT | Performed by: FAMILY MEDICINE

## 2023-05-23 PROCEDURE — 76642 ULTRASOUND BREAST LIMITED: CPT | Mod: LT

## 2023-05-24 ENCOUNTER — TELEPHONE (OUTPATIENT)
Dept: FAMILY MEDICINE | Facility: CLINIC | Age: 63
End: 2023-05-24

## 2023-05-24 NOTE — TELEPHONE ENCOUNTER
Please let patient know that her mammogram did not show any concerning findings.  It is noted that she had complaints of left breast pain at the time of her mammograms.  If she is having persistent symptoms, we could refer for breast surgical consultation just to be sure there are no clinical findings there that are not showing on imaging.  Can refer to Dr. Murray or surgeon of her choice to evaluate.

## 2024-01-22 ENCOUNTER — TELEPHONE (OUTPATIENT)
Dept: FAMILY MEDICINE | Facility: CLINIC | Age: 64
End: 2024-01-22
Payer: COMMERCIAL

## 2024-01-22 NOTE — TELEPHONE ENCOUNTER
Confirmation - Referral [id number]  Effective: 12/14/2023     Expires: 03/13/2024  Active  Referred From  [facility] in [place]  Specialty: Family Practice  Group NPI: [id number]  Provider ID: [id number]  Tax ID: [id number]  [address] [place] [address]  Referred To  [facility]  Specialty: Ophthalmology  Tier 2  Group NPI: [id number]  Provider ID: [id number]  Tax ID: [id number]  This referral is valid at any location for the above group  Patient Info  JHONATAN HAYES  [id number]  Female  1960  [address] [place] [address]  Clinical Information  Place of Service  Office  Service Type  Medical Care  Diagnosis  1H35.033 - hypertensive retinopathy, bilateral  Procedures  199499 - unlisted evaluation and management service

## 2024-02-19 ENCOUNTER — TELEPHONE (OUTPATIENT)
Dept: FAMILY MEDICINE | Facility: CLINIC | Age: 64
End: 2024-02-19
Payer: COMMERCIAL

## 2024-02-19 DIAGNOSIS — I10 ESSENTIAL HYPERTENSION: ICD-10-CM

## 2024-02-19 RX ORDER — LISINOPRIL 10 MG/1
10 TABLET ORAL DAILY
Qty: 90 TABLET | Refills: 0 | Status: SHIPPED | OUTPATIENT
Start: 2024-02-19

## 2024-02-19 NOTE — TELEPHONE ENCOUNTER
PATIENT MOVED ABOVE READING AND IS GOING TO MAKE AN APPOINTMENT WITH A PCP UP THERE IF SHE CAN'T GET IN NEXT WEEK SHE WILL MAKE AN APPOINTMENT WITH ONE OF THE NURSE ILANA'S HERE FOR HER MEDICATION TO BE REFILLED SHE WILLL CALL BACK AND LET US KNOW

## 2024-02-19 NOTE — TELEPHONE ENCOUNTER
Patient called to request a 90 day refill of the following medication:    Lisinopril 10mg    Pharmacy is confirmed to be [facility].    Please call patient with any questions at [phone].    Thank you!  Jill

## 2024-02-27 ENCOUNTER — OFFICE VISIT (OUTPATIENT)
Dept: FAMILY MEDICINE CLINIC | Facility: CLINIC | Age: 64
End: 2024-02-27

## 2024-02-27 VITALS
OXYGEN SATURATION: 97 % | HEIGHT: 63 IN | BODY MASS INDEX: 35.08 KG/M2 | HEART RATE: 83 BPM | WEIGHT: 198 LBS | SYSTOLIC BLOOD PRESSURE: 120 MMHG | DIASTOLIC BLOOD PRESSURE: 68 MMHG | TEMPERATURE: 97.6 F

## 2024-02-27 DIAGNOSIS — Z00.00 ANNUAL PHYSICAL EXAM: Primary | ICD-10-CM

## 2024-02-27 DIAGNOSIS — H81.13 BENIGN PAROXYSMAL POSITIONAL VERTIGO DUE TO BILATERAL VESTIBULAR DISORDER: ICD-10-CM

## 2024-02-27 DIAGNOSIS — Z12.31 ENCOUNTER FOR SCREENING MAMMOGRAM FOR BREAST CANCER: ICD-10-CM

## 2024-02-27 DIAGNOSIS — Z12.4 SCREENING FOR CERVICAL CANCER: ICD-10-CM

## 2024-02-27 DIAGNOSIS — I10 BENIGN ESSENTIAL HTN: ICD-10-CM

## 2024-02-27 DIAGNOSIS — Z11.59 NEED FOR HEPATITIS C SCREENING TEST: ICD-10-CM

## 2024-02-27 DIAGNOSIS — Z11.4 SCREENING FOR HIV (HUMAN IMMUNODEFICIENCY VIRUS): ICD-10-CM

## 2024-02-27 DIAGNOSIS — Z98.890 HISTORY OF REPAIR OF RECTOCELE: ICD-10-CM

## 2024-02-27 DIAGNOSIS — Z13.9 ENCOUNTER FOR SCREENING: ICD-10-CM

## 2024-02-27 PROCEDURE — 99213 OFFICE O/P EST LOW 20 MIN: CPT | Performed by: FAMILY MEDICINE

## 2024-02-27 PROCEDURE — 99386 PREV VISIT NEW AGE 40-64: CPT | Performed by: FAMILY MEDICINE

## 2024-02-27 RX ORDER — LISINOPRIL 10 MG/1
10 TABLET ORAL DAILY
COMMUNITY
Start: 2024-02-19 | End: 2024-02-27 | Stop reason: SDUPTHER

## 2024-02-27 RX ORDER — LISINOPRIL 10 MG/1
10 TABLET ORAL DAILY
Qty: 90 TABLET | Refills: 3 | Status: SHIPPED | OUTPATIENT
Start: 2024-02-27

## 2024-02-27 RX ORDER — MECLIZINE HYDROCHLORIDE 25 MG/1
25 TABLET ORAL 3 TIMES DAILY PRN
Qty: 30 TABLET | Refills: 3 | Status: SHIPPED | OUTPATIENT
Start: 2024-02-27

## 2024-02-27 NOTE — PROGRESS NOTES
Assessment/Plan:       1. Annual physical exam  Assessment & Plan:  Reviewed age-appropriate health maintenance and preventive care.        2. Benign essential HTN  Assessment & Plan:  Continue meds, no changes    Orders:  -     CBC and Platelet; Future  -     Comprehensive metabolic panel; Future  -     Lipid Panel With Direct LDL; Future  -     TSH, 3rd generation with Free T4 reflex; Future; Expected date: 02/27/2024  -     lisinopril (ZESTRIL) 10 mg tablet; Take 1 tablet (10 mg total) by mouth daily    3. Benign paroxysmal positional vertigo due to bilateral vestibular disorder  Comments:  start meclizine prn  Orders:  -     meclizine (ANTIVERT) 25 mg tablet; Take 1 tablet (25 mg total) by mouth 3 (three) times a day as needed for dizziness    4. Need for hepatitis C screening test  -     Hepatitis C Antibody; Future    5. Screening for HIV (human immunodeficiency virus)  -     HIV 1/2 AG/AB w Reflex [facility] for 2 yr old and above; Future    6. Screening for cervical cancer  -     Ambulatory referral to Obstetrics / Gynecology; Future    7. Encounter for screening mammogram for breast cancer  -     Mammo screening bilateral w 3d & cad; Future; Expected date: 02/27/2024    8. Encounter for screening  -     Cologuard    9. History of repair of rectocele  -     Ambulatory Referral to Colorectal Surgery; Future          Subjective:      Patient ID: Gypsy Valdovinos is a 63 y.o. female.    Gypsy is here for an annual visit and her first visit.  Very pleasant 64yo with a hx of anxiety/htn/rectal prolapse.  She is doing great on lisinopril.  She has no CP or sob.  She needs labs/cologuard/gyn eval.  She has been having fecal smearing since her rectal prolapse surgery.  She needs a lisinopril refill.  She has intermittent occasional vertigo.  Will refill meclizine or initiate meclizine.  We discussed diet/exercise/weight loss.  She does not smoke.  Very occasional alcohol.        The following portions of the patient's  "history were reviewed and updated as appropriate: allergies, current medications, past family history, past medical history, past social history, past surgical history, and problem list.    Review of Systems   Respiratory: Negative.     Cardiovascular: Negative.          Objective:      /68 (BP Location: Right arm, Patient Position: Sitting, Cuff Size: Extra-Large)   Pulse 83   Temp 97.6 °F (36.4 °C)   Ht 5' 3\" (1.6 m)   Wt 89.8 kg (198 lb)   SpO2 97%   BMI 35.07 kg/m²          Physical Exam  Vitals and nursing note reviewed.   Constitutional:       Appearance: Normal appearance.   HENT:      Head: Normocephalic and atraumatic.      Nose: Nose normal.      Mouth/Throat:      Mouth: Mucous membranes are moist.   Eyes:      Extraocular Movements: Extraocular movements intact.      Pupils: Pupils are equal, round, and reactive to light.   Cardiovascular:      Rate and Rhythm: Normal rate and regular rhythm.      Pulses: Normal pulses.   Pulmonary:      Effort: Pulmonary effort is normal.      Breath sounds: Normal breath sounds.   Abdominal:      General: Bowel sounds are normal.      Palpations: Abdomen is soft.   Musculoskeletal:      Cervical back: Normal range of motion.   Skin:     General: Skin is warm and dry.      Capillary Refill: Capillary refill takes less than 2 seconds.   Neurological:      General: No focal deficit present.      Mental Status: She is alert.   Psychiatric:         Mood and Affect: Mood normal.         "

## 2024-03-11 ENCOUNTER — APPOINTMENT (OUTPATIENT)
Dept: LAB | Facility: CLINIC | Age: 64
End: 2024-03-11
Payer: COMMERCIAL

## 2024-03-11 DIAGNOSIS — Z11.4 SCREENING FOR HIV (HUMAN IMMUNODEFICIENCY VIRUS): ICD-10-CM

## 2024-03-11 DIAGNOSIS — I10 BENIGN ESSENTIAL HTN: ICD-10-CM

## 2024-03-11 DIAGNOSIS — Z11.59 NEED FOR HEPATITIS C SCREENING TEST: ICD-10-CM

## 2024-03-11 LAB
ALBUMIN SERPL BCP-MCNC: 3.9 G/DL (ref 3.5–5)
ALP SERPL-CCNC: 91 U/L (ref 34–104)
ALT SERPL W P-5'-P-CCNC: 21 U/L (ref 7–52)
ANION GAP SERPL CALCULATED.3IONS-SCNC: 7 MMOL/L
AST SERPL W P-5'-P-CCNC: 21 U/L (ref 13–39)
BILIRUB SERPL-MCNC: 0.36 MG/DL (ref 0.2–1)
BUN SERPL-MCNC: 11 MG/DL (ref 5–25)
CALCIUM SERPL-MCNC: 9.3 MG/DL (ref 8.4–10.2)
CHLORIDE SERPL-SCNC: 103 MMOL/L (ref 96–108)
CHOLEST SERPL-MCNC: 177 MG/DL
CO2 SERPL-SCNC: 32 MMOL/L (ref 21–32)
CREAT SERPL-MCNC: 0.77 MG/DL (ref 0.6–1.3)
ERYTHROCYTE [DISTWIDTH] IN BLOOD BY AUTOMATED COUNT: 13.2 % (ref 11.6–15.1)
GFR SERPL CREATININE-BSD FRML MDRD: 81 ML/MIN/1.73SQ M
GLUCOSE P FAST SERPL-MCNC: 90 MG/DL (ref 65–99)
HCT VFR BLD AUTO: 41.5 % (ref 34.8–46.1)
HDLC SERPL-MCNC: 55 MG/DL
HGB BLD-MCNC: 13.3 G/DL (ref 11.5–15.4)
LDLC SERPL CALC-MCNC: 104 MG/DL (ref 0–100)
MCH RBC QN AUTO: 28.2 PG (ref 26.8–34.3)
MCHC RBC AUTO-ENTMCNC: 32 G/DL (ref 31.4–37.4)
MCV RBC AUTO: 88 FL (ref 82–98)
PLATELET # BLD AUTO: 251 THOUSANDS/UL (ref 149–390)
PMV BLD AUTO: 12.7 FL (ref 8.9–12.7)
POTASSIUM SERPL-SCNC: 4.2 MMOL/L (ref 3.5–5.3)
PROT SERPL-MCNC: 6.9 G/DL (ref 6.4–8.4)
RBC # BLD AUTO: 4.72 MILLION/UL (ref 3.81–5.12)
SODIUM SERPL-SCNC: 142 MMOL/L (ref 135–147)
TRIGL SERPL-MCNC: 92 MG/DL
TSH SERPL DL<=0.05 MIU/L-ACNC: 2.29 UIU/ML (ref 0.45–4.5)
WBC # BLD AUTO: 7.3 THOUSAND/UL (ref 4.31–10.16)

## 2024-03-11 PROCEDURE — 87389 HIV-1 AG W/HIV-1&-2 AB AG IA: CPT

## 2024-03-11 PROCEDURE — 80053 COMPREHEN METABOLIC PANEL: CPT

## 2024-03-11 PROCEDURE — 80061 LIPID PANEL: CPT

## 2024-03-11 PROCEDURE — 36415 COLL VENOUS BLD VENIPUNCTURE: CPT

## 2024-03-11 PROCEDURE — 86803 HEPATITIS C AB TEST: CPT

## 2024-03-11 PROCEDURE — 85027 COMPLETE CBC AUTOMATED: CPT

## 2024-03-11 PROCEDURE — 84443 ASSAY THYROID STIM HORMONE: CPT

## 2024-03-12 LAB
HCV AB SER QL: NORMAL
HIV 1+2 AB+HIV1 P24 AG SERPL QL IA: NORMAL
HIV 2 AB SERPL QL IA: NORMAL
HIV1 AB SERPL QL IA: NORMAL
HIV1 P24 AG SERPL QL IA: NORMAL

## 2024-03-17 LAB — COLOGUARD RESULT REPORTABLE: NEGATIVE

## 2024-03-18 ENCOUNTER — TELEPHONE (OUTPATIENT)
Dept: FAMILY MEDICINE CLINIC | Facility: CLINIC | Age: 64
End: 2024-03-18

## 2024-03-18 ENCOUNTER — TELEPHONE (OUTPATIENT)
Age: 64
End: 2024-03-18

## 2024-03-28 ENCOUNTER — OFFICE VISIT (OUTPATIENT)
Dept: OBGYN CLINIC | Facility: CLINIC | Age: 64
End: 2024-03-28
Payer: COMMERCIAL

## 2024-03-28 VITALS
DIASTOLIC BLOOD PRESSURE: 64 MMHG | SYSTOLIC BLOOD PRESSURE: 128 MMHG | HEART RATE: 77 BPM | HEIGHT: 63 IN | BODY MASS INDEX: 34.41 KG/M2 | OXYGEN SATURATION: 95 % | WEIGHT: 194.2 LBS

## 2024-03-28 DIAGNOSIS — Z01.419 ENCOUNTER FOR ANNUAL ROUTINE GYNECOLOGICAL EXAMINATION: Primary | ICD-10-CM

## 2024-03-28 DIAGNOSIS — Z12.4 SCREENING FOR CERVICAL CANCER: ICD-10-CM

## 2024-03-28 DIAGNOSIS — N81.10 FEMALE CYSTOCELE: ICD-10-CM

## 2024-03-28 DIAGNOSIS — N39.3 FEMALE STRESS INCONTINENCE: ICD-10-CM

## 2024-03-28 PROCEDURE — G0476 HPV COMBO ASSAY CA SCREEN: HCPCS | Performed by: OBSTETRICS & GYNECOLOGY

## 2024-03-28 PROCEDURE — S0610 ANNUAL GYNECOLOGICAL EXAMINA: HCPCS | Performed by: OBSTETRICS & GYNECOLOGY

## 2024-03-28 PROCEDURE — G0145 SCR C/V CYTO,THINLAYER,RESCR: HCPCS | Performed by: OBSTETRICS & GYNECOLOGY

## 2024-03-28 RX ORDER — DIPHENOXYLATE HYDROCHLORIDE AND ATROPINE SULFATE 2.5; .025 MG/1; MG/1
1 TABLET ORAL DAILY
COMMUNITY

## 2024-03-28 NOTE — PATIENT INSTRUCTIONS
[email or web address]    Dr. Dereje Laryr     [address]  [address]  [place], [address] [phone]
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
## 2024-03-28 NOTE — ASSESSMENT & PLAN NOTE
- Discussed ACOG guidelines for pap smear screening frequency: performed today.  - Discussed healthy lifestyle recommendations for diet, exercise and self breast awareness.  - Discussed ACOG recommendations for screening mammograms: due in May, given referral from PCP   - Discussed age based recommendations for adequate calcium and vitamin D intake. No additional osteoporosis screening indicated at this time.  - Discussed ACOG recommendations for colon cancer screening: not indicated at this time/up to date   - Safe sex practices were discussed and STI testing was not desired by the patient  - Contraceptive options were reviewed: n/a   - Routine follow up in 1 year was recommended or sooner as needed. All questions and concerns were addressed.

## 2024-03-29 LAB
HPV HR 12 DNA CVX QL NAA+PROBE: NEGATIVE
HPV16 DNA CVX QL NAA+PROBE: NEGATIVE
HPV18 DNA CVX QL NAA+PROBE: NEGATIVE

## 2024-04-06 LAB
LAB AP GYN PRIMARY INTERPRETATION: NORMAL
Lab: NORMAL

## 2024-04-07 ENCOUNTER — TELEPHONE (OUTPATIENT)
Dept: OTHER | Facility: OTHER | Age: 64
End: 2024-04-07

## 2024-04-07 NOTE — TELEPHONE ENCOUNTER
Progress Note:      Mammogram Screening ([facility]):  Pap smear screening (Clinic vs [facility] vs [facility]):  [facility] ([facility]     Transportation:     Education: Patient is insured and receiving care with [facility].

## 2024-04-17 ENCOUNTER — TELEPHONE (OUTPATIENT)
Age: 64
End: 2024-04-17

## 2024-04-17 NOTE — TELEPHONE ENCOUNTER
Pt called in stating she been trying to get an appt with following referred Dr. Bere Meredith but when ever she calls in goes to voice message and she been leaving message with contact number but no one is returning her call back. She is requesting Dr. Budinetz to refer to other doctor or help her to get appt with the referred doctor or an alternate phone number to call in to schedule appt. Please do help.    Unable to schedule appt with the below mentioned doctor office the phone always goes to voice message.  Referred To:    Bere Meredith MD  Phone: [phone]  Fax: [phone]   [address]  [address]  [facility] [address]     Thanks

## 2024-04-18 DIAGNOSIS — Z12.11 COLON CANCER SCREENING: Primary | ICD-10-CM

## 2024-04-18 NOTE — TELEPHONE ENCOUNTER
Spoke with patient - let her know a new referral was placed. She will stop in to  a physical copy of the referral. Placed in patient pickup bin

## 2024-05-01 PROBLEM — Z01.419 ENCOUNTER FOR ANNUAL ROUTINE GYNECOLOGICAL EXAMINATION: Status: RESOLVED | Noted: 2024-03-28 | Resolved: 2024-05-01

## 2024-06-04 ENCOUNTER — OFFICE VISIT (OUTPATIENT)
Dept: FAMILY MEDICINE CLINIC | Facility: CLINIC | Age: 64
End: 2024-06-04
Payer: COMMERCIAL

## 2024-06-04 VITALS
SYSTOLIC BLOOD PRESSURE: 124 MMHG | BODY MASS INDEX: 34.02 KG/M2 | DIASTOLIC BLOOD PRESSURE: 82 MMHG | OXYGEN SATURATION: 96 % | HEIGHT: 63 IN | WEIGHT: 192 LBS | HEART RATE: 77 BPM

## 2024-06-04 DIAGNOSIS — Z12.83 SKIN EXAM FOR MALIGNANT NEOPLASM: ICD-10-CM

## 2024-06-04 DIAGNOSIS — I80.8 PHLEBITIS OF LEFT ARM: Primary | ICD-10-CM

## 2024-06-04 DIAGNOSIS — E66.09 CLASS 1 OBESITY DUE TO EXCESS CALORIES WITH SERIOUS COMORBIDITY AND BODY MASS INDEX (BMI) OF 34.0 TO 34.9 IN ADULT: ICD-10-CM

## 2024-06-04 DIAGNOSIS — I10 BENIGN ESSENTIAL HTN: ICD-10-CM

## 2024-06-04 DIAGNOSIS — L60.0 INGROWN NAIL OF GREAT TOE OF RIGHT FOOT: ICD-10-CM

## 2024-06-04 DIAGNOSIS — R42 DIZZINESS: ICD-10-CM

## 2024-06-04 PROCEDURE — 99214 OFFICE O/P EST MOD 30 MIN: CPT | Performed by: FAMILY MEDICINE

## 2024-06-04 NOTE — PROGRESS NOTES
Assessment/Plan:       1. Phlebitis of left arm  Comments:  discussed phlebitis  recommend elevate/warm compresses/aleve   check duplex  Orders:  -     VAS upper limb venous duplex scan, unilateral/limited; Future; Expected date: 06/04/2024  2. Class 1 obesity due to excess calories with serious comorbidity and body mass index (BMI) of 34.0 to 34.9 in adult  Comments:  discussed diet/exercise  start wegovy  Orders:  -     Semaglutide-Weight Management (WEGOVY) 0.25 MG/0.5ML; Inject 0.5 mL (0.25 mg total) under the skin once a week  3. Dizziness  Comments:  decrease lisinopril to 5mg qd  4. Benign essential HTN  Assessment & Plan:  Decrease lisinopril from 10mg to 5mg qd  5. Ingrown nail of great toe of right foot  Comments:  ref to podiatry  Orders:  -     Ambulatory Referral to Podiatry; Future  6. Skin exam for malignant neoplasm  -     Ambulatory Referral to Dermatology; Future        Subjective:      Patient ID: Gypsy Valdovinos is a 63 y.o. female.    The patient is here with a couple of concerns.  She gets lightheaded and dizzy occasionally with positional changes.  Her blood pressure runs on the low side and is she is on 10 mg of lisinopril daily.  No headache blurry vision chest pain or shortness of breath.  Working to decrease the dose slightly.  She also is frustrated with her weight.  She is struggled with this throughout her life.  She has failed diet and exercise.  She would like to consider medical options for weight loss.  She also has a varicose or superficial vein on her left upper extremity that is swollen and puffy and sometimes tender.  No injury or trauma.  She would like to see a skin doctor and foot doctor as well.        The following portions of the patient's history were reviewed and updated as appropriate: allergies, current medications, past family history, past medical history, past social history, past surgical history, and problem list.    Review of Systems      Objective:      /82  "(BP Location: Left arm, Patient Position: Sitting, Cuff Size: Large)   Pulse 77   Ht 5' 3\" (1.6 m)   Wt 87.1 kg (192 lb)   SpO2 96%   BMI 34.01 kg/m²          Physical Exam    "

## 2024-06-05 ENCOUNTER — HOSPITAL ENCOUNTER (OUTPATIENT)
Dept: NON INVASIVE DIAGNOSTICS | Facility: HOSPITAL | Age: 64
Discharge: HOME | End: 2024-06-05
Attending: FAMILY MEDICINE
Payer: COMMERCIAL

## 2024-06-05 DIAGNOSIS — I80.8 PHLEBITIS OF LEFT ARM: ICD-10-CM

## 2024-06-05 PROCEDURE — 93971 EXTREMITY STUDY: CPT

## 2024-06-05 PROCEDURE — 93971 EXTREMITY STUDY: CPT | Performed by: SURGERY

## 2024-07-23 ENCOUNTER — OFFICE VISIT (OUTPATIENT)
Dept: FAMILY MEDICINE CLINIC | Facility: CLINIC | Age: 64
End: 2024-07-23
Payer: COMMERCIAL

## 2024-07-23 VITALS
HEART RATE: 79 BPM | DIASTOLIC BLOOD PRESSURE: 72 MMHG | OXYGEN SATURATION: 97 % | BODY MASS INDEX: 34.94 KG/M2 | WEIGHT: 197.2 LBS | SYSTOLIC BLOOD PRESSURE: 124 MMHG | HEIGHT: 63 IN

## 2024-07-23 DIAGNOSIS — M67.442 GANGLION, FINGER JOINT OF LEFT HAND: Primary | ICD-10-CM

## 2024-07-23 DIAGNOSIS — I10 BENIGN ESSENTIAL HTN: ICD-10-CM

## 2024-07-23 DIAGNOSIS — M62.89 PELVIC FLOOR DYSFUNCTION IN FEMALE: ICD-10-CM

## 2024-07-23 PROCEDURE — 99214 OFFICE O/P EST MOD 30 MIN: CPT | Performed by: FAMILY MEDICINE
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
## 2024-08-16 ENCOUNTER — HOSPITAL ENCOUNTER (OUTPATIENT)
Dept: MAMMOGRAPHY | Facility: MEDICAL CENTER | Age: 64
Discharge: HOME | End: 2024-08-16
Payer: COMMERCIAL

## 2024-08-16 VITALS — WEIGHT: 197 LBS | HEIGHT: 63 IN | BODY MASS INDEX: 34.91 KG/M2

## 2024-08-16 DIAGNOSIS — Z12.31 ENCOUNTER FOR SCREENING MAMMOGRAM FOR BREAST CANCER: ICD-10-CM

## 2024-08-16 PROCEDURE — 77063 BREAST TOMOSYNTHESIS BI: CPT

## 2024-08-16 PROCEDURE — 77067 SCR MAMMO BI INCL CAD: CPT

## 2024-08-20 ENCOUNTER — TELEPHONE (OUTPATIENT)
Age: 64
End: 2024-08-20

## 2024-08-20 NOTE — TELEPHONE ENCOUNTER
Pt called, she misplaced the provider referral for dermatology.  Kindly mail to patient - confirmed address on file.   Referral# [id number]

## 2024-08-28 ENCOUNTER — OFFICE VISIT (OUTPATIENT)
Dept: OBGYN CLINIC | Facility: MEDICAL CENTER | Age: 64
End: 2024-08-28
Payer: COMMERCIAL

## 2024-08-28 VITALS
WEIGHT: 195 LBS | SYSTOLIC BLOOD PRESSURE: 127 MMHG | HEART RATE: 75 BPM | DIASTOLIC BLOOD PRESSURE: 78 MMHG | BODY MASS INDEX: 34.55 KG/M2 | HEIGHT: 63 IN

## 2024-08-28 DIAGNOSIS — M67.442 GANGLION, FINGER JOINT OF LEFT HAND: Primary | ICD-10-CM

## 2024-08-28 PROCEDURE — 99203 OFFICE O/P NEW LOW 30 MIN: CPT | Performed by: ORTHOPAEDIC SURGERY

## 2024-08-28 RX ORDER — ESTRADIOL 0.1 MG/G
CREAM VAGINAL
COMMUNITY
Start: 2024-08-08

## 2024-08-28 NOTE — PROGRESS NOTES
The HAND & UPPER EXTREMITY OFFICE VISIT   Referred By:  Robert Budinetz, Md  [address] [place] [address]      Chief Complaint:     Left ring finger mass    History of Present Illness:   63 y.o., female presents with a mass of the left ring finger PIP joint. She denies any notable trauma prior to the onset of her symptoms. The mass was previously tender, larger, and prevented her from being able to wear or remove rings on the finger. Today she reports the mass has actually significantly gone down in size. She is now able to wear and remove her rings without being blocked by the mass. She denies any significant pain or limitations due to the mass today.     ADLs: Community ambulator  Smoke: denies ETOH: denies   Drugs:  denies        Past Medical History:  History reviewed. No pertinent past medical history.  Past Surgical History:   Procedure Laterality Date    RECTAL SURGERY      REPLACEMENT TOTAL KNEE Bilateral     TUBAL LIGATION       Family History   Problem Relation Age of Onset    No Known Problems Mother     Colon cancer Father 72    No Known Problems Sister     No Known Problems Maternal Grandmother     No Known Problems Maternal Grandfather     No Known Problems Paternal Grandmother     No Known Problems Paternal Grandfather     Breast cancer Maternal Aunt 50    Stomach cancer Maternal Uncle 55    Testicular cancer Nephew 21    Ovarian cancer Neg Hx      Social History     Socioeconomic History    Marital status: /Civil Union     Spouse name: Not on file    Number of children: Not on file    Years of education: Not on file    Highest education level: Not on file   Occupational History    Not on file   Tobacco Use    Smoking status: Never     Passive exposure: Never    Smokeless tobacco: Never   Vaping Use    Vaping status: Never Used   Substance and Sexual Activity    Alcohol use: Not Currently    Drug use: Never    Sexual activity: Not Currently   Other Topics Concern    Not on file   Social  "History Narrative    Not on file     Social Determinants of Health     Financial Resource Strain: Not on file   Food Insecurity: Not on file   Transportation Needs: Not on file   Physical Activity: Not on file   Stress: Not on file   Social Connections: Not on file   Intimate Partner Violence: Not on file   Housing Stability: Not on file     Scheduled Meds:  Continuous Infusions:No current facility-administered medications for this visit.    PRN Meds:.  Allergies   Allergen Reactions    Penicillins Rash     Yeast infection           Physical Examination:    /78   Pulse 75   Ht 5' 3\" (1.6 m)   Wt 88.5 kg (195 lb)   LMP  (LMP Unknown) Comment: postmenopausal  BMI 34.54 kg/m²     Gen: A&Ox3, NAD  Cardiac: regular rate  Chest: non labored breathing  Abdomen: Non-distended      Right Upper Extremity:  Skin CDI  No obvious deformity of the shoulder, arm, elbow, forearm, wrist, hand  Sensation intact to light touch in the axillary median, ulnar, and radial nerve distributions  Motor function grossly intact  2+RP      Left Upper Extremity:  Skin CDI  Small residual mass present on the radial aspect of the ring finger PIP joint. Non-tender to palpation  Minimal swelling around the ring finger PIP  Sensation intact to light touch in the axillary median, ulnar, and radial nerve distributions  Full composite fist, full ring finger ROM  2+RP      Studies:  No imaging to review      Assessment and Plan:  1. Ganglion, finger joint of left hand  Ambulatory Referral to Orthopedic Surgery          63 y.o. female presents with signs and symptoms consistent with the above diagnosis.  We discussed the natural history of this condition and its pathogenesis.  We discussed operative and nonoperative treatment options. We discussed that her symptoms are consistent with a small ganglion cyst which is likely coming from the PIP joint. The mass has now spontaneously decreased in size, and is no longer bothersome. We discussed " treatment options including observation, compression dressing, and surgery if the symptoms worsen or return. She is comfortable with continuing to observe the area. Finger compression sleeve provided in the office today to help expel any residual cyst fluid and prevent recurrence. It is recommended she return to the office as needed if the problem fails to improve, worsens, or recurs.    she expressed understanding of the plan and agreed. We encouraged them to contact our office with any questions or concerns.         Jamshid Zelaya MD  Hand and Upper Extremity Surgery        *This note was dictated using Dragon voice recognition software. Please excuse any word substitutions or errors.*

## 2024-09-03 ENCOUNTER — OFFICE VISIT (OUTPATIENT)
Dept: FAMILY MEDICINE CLINIC | Facility: CLINIC | Age: 64
End: 2024-09-03
Payer: COMMERCIAL

## 2024-09-03 VITALS
WEIGHT: 201 LBS | DIASTOLIC BLOOD PRESSURE: 70 MMHG | HEIGHT: 63 IN | OXYGEN SATURATION: 99 % | SYSTOLIC BLOOD PRESSURE: 115 MMHG | BODY MASS INDEX: 35.61 KG/M2 | HEART RATE: 78 BPM

## 2024-09-03 DIAGNOSIS — I10 BENIGN ESSENTIAL HTN: Primary | ICD-10-CM

## 2024-09-03 DIAGNOSIS — F41.9 ANXIETY: ICD-10-CM

## 2024-09-03 DIAGNOSIS — Z12.83 SKIN EXAM FOR MALIGNANT NEOPLASM: ICD-10-CM

## 2024-09-03 PROCEDURE — 99214 OFFICE O/P EST MOD 30 MIN: CPT | Performed by: FAMILY MEDICINE

## 2024-09-03 RX ORDER — ESCITALOPRAM OXALATE 10 MG/1
10 TABLET ORAL DAILY
Qty: 30 TABLET | Refills: 1 | Status: SHIPPED | OUTPATIENT
Start: 2024-09-03 | End: 2025-03-02

## 2024-09-03 NOTE — PROGRESS NOTES
"Assessment/Plan:       1. Benign essential HTN  Assessment & Plan:  Continue lisinopril  No changes  2. Anxiety  Comments:  will start lexapro  Orders:  -     escitalopram (LEXAPRO) 10 mg tablet; Take 1 tablet (10 mg total) by mouth daily  3. Skin exam for malignant neoplasm  -     Ambulatory Referral to Dermatology; Future        Subjective:      Patient ID: Gypsy Valdovinos is a 63 y.o. female.    Gypsy is here for a f/u.  She is stable.  Bp looks good.  She does struggle with her weight.  She has trouble stopping once she starts eating.  She is under some stress now with her 's diagnosis.  She is possibly feeding her anxiety/stress.  Will start lexapro.  Wegovy was over $1500 to fill.  Would not recommend phentermine.  She saw the pelvic specialist and the rectal specialist.  She is considering pelvic PT.          The following portions of the patient's history were reviewed and updated as appropriate: allergies, current medications, past family history, past medical history, past social history, past surgical history, and problem list.    Review of Systems   Respiratory: Negative.     Cardiovascular: Negative.    Gastrointestinal: Negative.          Objective:      /70 (BP Location: Left arm, Patient Position: Sitting, Cuff Size: Large)   Pulse 78   Ht 5' 3\" (1.6 m)   Wt 91.2 kg (201 lb)   LMP  (LMP Unknown) Comment: postmenopausal  SpO2 99%   BMI 35.61 kg/m²          Physical Exam  Vitals and nursing note reviewed.   Constitutional:       Appearance: Normal appearance.   HENT:      Head: Normocephalic and atraumatic.      Nose: Nose normal.      Mouth/Throat:      Mouth: Mucous membranes are moist.   Eyes:      Extraocular Movements: Extraocular movements intact.      Pupils: Pupils are equal, round, and reactive to light.   Cardiovascular:      Rate and Rhythm: Normal rate and regular rhythm.      Pulses: Normal pulses.   Pulmonary:      Effort: Pulmonary effort is normal.      Breath sounds: " Normal breath sounds.   Abdominal:      General: Bowel sounds are normal.      Palpations: Abdomen is soft.   Musculoskeletal:      Cervical back: Normal range of motion.   Skin:     General: Skin is warm and dry.      Capillary Refill: Capillary refill takes less than 2 seconds.   Neurological:      General: No focal deficit present.      Mental Status: She is alert.   Psychiatric:         Mood and Affect: Mood normal.

## 2024-09-26 ENCOUNTER — TELEPHONE (OUTPATIENT)
Age: 64
End: 2024-09-26

## 2024-09-26 DIAGNOSIS — E66.811 CLASS 1 OBESITY DUE TO EXCESS CALORIES WITH SERIOUS COMORBIDITY AND BODY MASS INDEX (BMI) OF 34.0 TO 34.9 IN ADULT: ICD-10-CM

## 2024-09-26 DIAGNOSIS — E66.09 CLASS 1 OBESITY DUE TO EXCESS CALORIES WITH SERIOUS COMORBIDITY AND BODY MASS INDEX (BMI) OF 34.0 TO 34.9 IN ADULT: ICD-10-CM

## 2024-09-26 NOTE — TELEPHONE ENCOUNTER
Pt was calling about Wegovy script.  Stated she doesn't have prescription plan.  When sent to [facility] the bill would be $1600.    She believes she can get the medication through [facility] in [place], [place]  [phone]  Phone [phone]    She believes the cost will be less.  Would Dr Budinetz try to put a script into that pharmacy.    Please advise

## 2024-11-20 ENCOUNTER — TELEPHONE (OUTPATIENT)
Dept: FAMILY MEDICINE CLINIC | Facility: CLINIC | Age: 64
End: 2024-11-20

## 2024-11-20 DIAGNOSIS — E66.9 OBESITY, UNSPECIFIED CLASS, UNSPECIFIED OBESITY TYPE, UNSPECIFIED WHETHER SERIOUS COMORBIDITY PRESENT: Primary | ICD-10-CM

## 2024-11-20 NOTE — TELEPHONE ENCOUNTER
Request received from pharmacy for increase in dosage for patients Semaglutide(Medivant), patient is ready for 0.5mg dosage. Please review and send to pharmacy

## 2024-11-20 NOTE — TELEPHONE ENCOUNTER
Patient called the office to check the status on her medication request. Pt was made aware medication was sent to the pharmacy

## 2024-12-12 ENCOUNTER — TELEPHONE (OUTPATIENT)
Age: 64
End: 2024-12-12

## 2024-12-12 NOTE — TELEPHONE ENCOUNTER
Pt called in stating someone from the office confirmed that her next dosage level of Wegovy med prescription has already been called in but so far she is unable to collect the med. Would want to know the status. Kindly call her back please. Thanks

## 2024-12-16 ENCOUNTER — TELEPHONE (OUTPATIENT)
Age: 64
End: 2024-12-16

## 2024-12-16 ENCOUNTER — NURSE TRIAGE (OUTPATIENT)
Age: 64
End: 2024-12-16

## 2024-12-16 DIAGNOSIS — E66.9 OBESITY, UNSPECIFIED CLASS, UNSPECIFIED OBESITY TYPE, UNSPECIFIED WHETHER SERIOUS COMORBIDITY PRESENT: ICD-10-CM

## 2024-12-16 NOTE — TELEPHONE ENCOUNTER
"Patient requesting prescription for Wegovy be resent to [facility] pharmacy, initially was sent to [facility]. Resent to patient requested pharmacy, nothing else needed at this time.     Reason for Disposition   Prescription prescribed recently is not at pharmacy and triager has access to patient's EMR and prescription is recorded in the EMR    Answer Assessment - Initial Assessment Questions  1. NAME of MEDICINE: \"What medicine(s) are you calling about?\"      Semaglutide-Weight Management (WEGOVY) 0.5 MG/0.5ML  2. QUESTION: \"What is your question?\" (e.g., double dose of medicine, side effect)      Medication initially sent to [facility], this needs to be resent to [facility] pharmacy  3. PRESCRIBER: \"Who prescribed the medicine?\" Reason: if prescribed by specialist, call should be referred to that group.      Robert Budinetz, MD    Protocols used: Medication Question Call-Adult-[place]    "

## 2024-12-16 NOTE — TELEPHONE ENCOUNTER
Pt called and stated that [facility] never received the script for her Wegovy.    Reading messages looks like the order was sent to [facility] on 11/20    Pt warm transferred to VIRIDIANA Oneill to take care of moving to [facility].

## 2025-02-12 ENCOUNTER — OFFICE VISIT (OUTPATIENT)
Dept: FAMILY MEDICINE CLINIC | Facility: CLINIC | Age: 65
End: 2025-02-12
Payer: MEDICARE

## 2025-02-12 VITALS
OXYGEN SATURATION: 99 % | SYSTOLIC BLOOD PRESSURE: 122 MMHG | HEIGHT: 63 IN | BODY MASS INDEX: 34.02 KG/M2 | DIASTOLIC BLOOD PRESSURE: 72 MMHG | WEIGHT: 192 LBS | HEART RATE: 82 BPM

## 2025-02-12 DIAGNOSIS — I10 BENIGN ESSENTIAL HTN: Primary | ICD-10-CM

## 2025-02-12 DIAGNOSIS — L98.9 SKIN LESION: ICD-10-CM

## 2025-02-12 DIAGNOSIS — Z23 ENCOUNTER FOR IMMUNIZATION: ICD-10-CM

## 2025-02-12 DIAGNOSIS — Z78.0 POST-MENOPAUSAL: ICD-10-CM

## 2025-02-12 PROCEDURE — 99214 OFFICE O/P EST MOD 30 MIN: CPT | Performed by: FAMILY MEDICINE

## 2025-02-12 PROCEDURE — G2211 COMPLEX E/M VISIT ADD ON: HCPCS | Performed by: FAMILY MEDICINE

## 2025-02-12 NOTE — ASSESSMENT & PLAN NOTE
Continue lisinopril  No changes    Orders:    CBC and differential; Future    Comprehensive metabolic panel; Future    Lipid panel; Future    LDL cholesterol, direct; Future    TSH, 3rd generation with Free T4 reflex; Future

## 2025-02-12 NOTE — PROGRESS NOTES
"She name: Gypsy Valdovinos      : 1960      MRN: [id number]  Encounter Provider: Robert Budinetz, MD  Encounter Date: 2025   Encounter department: [facility] PRIMARY CARE  :  Assessment & Plan  Benign essential HTN  Continue lisinopril  No changes    Orders:    CBC and differential; Future    Comprehensive metabolic panel; Future    Lipid panel; Future    LDL cholesterol, direct; Future    TSH, 3rd generation with Free T4 reflex; Future    Post-menopausal  Check a DEXA scan  Orders:    DXA bone density spine hip and pelvis; Future    Encounter for immunization  Get flu shot  Orders:    influenza vaccine, high-dose, PF 0.5 mL (Fluzone High Dose)    Skin lesion  Refer to dermatology  Orders:    Ambulatory Referral to Dermatology; Future           History of Present Illness   The patient is here to follow-up on hypertension.  She has no headache blurry vision chest pain or shortness of breath.  Blood pressure stable and well-controlled on a low-dose of lisinopril.  She does need a DEXA scan to screen for osteoporosis.  She needs a flu shot today as well.      Review of Systems    Objective   /72 (BP Location: Right arm, Patient Position: Sitting, Cuff Size: Standard)   Pulse 82   Ht 5' 3\" (1.6 m)   Wt 87.1 kg (192 lb)   SpO2 99%   BMI 34.01 kg/m²      Physical Exam  Vitals and nursing note reviewed.   Constitutional:       General: She is not in acute distress.     Appearance: She is well-developed.   HENT:      Head: Normocephalic and atraumatic.   Eyes:      Conjunctiva/sclera: Conjunctivae normal.   Cardiovascular:      Rate and Rhythm: Normal rate and regular rhythm.      Heart sounds: No murmur heard.  Pulmonary:      Effort: Pulmonary effort is normal. No respiratory distress.      Breath sounds: Normal breath sounds.   Abdominal:      Palpations: Abdomen is soft.      Tenderness: There is no abdominal tenderness.   Musculoskeletal:         General: No swelling.      Cervical back: " Neck supple.   Skin:     General: Skin is warm and dry.      Capillary Refill: Capillary refill takes less than 2 seconds.   Neurological:      Mental Status: She is alert.   Psychiatric:         Mood and Affect: Mood normal.

## 2025-02-13 ENCOUNTER — TELEPHONE (OUTPATIENT)
Age: 65
End: 2025-02-13

## 2025-02-13 NOTE — TELEPHONE ENCOUNTER
Pt received missed call from colon and rectal surgery and inquired if referral was placed as she does not remember discussing at appt yesterday. Reviewed chart and advised pt looks like call was placed yesterday from previous referral placed back on 2/27/24. She stated she will follow up with colon and rectal surgery to advise them she has already seen specialist at [facility].

## 2025-03-07 ENCOUNTER — RA CDI HCC (OUTPATIENT)
Dept: OTHER | Facility: HOSPITAL | Age: 65
End: 2025-03-07

## 2025-03-07 ENCOUNTER — HOSPITAL ENCOUNTER (OUTPATIENT)
Dept: RADIOLOGY | Facility: CLINIC | Age: 65
End: 2025-03-07
Payer: MEDICARE

## 2025-03-07 VITALS — BODY MASS INDEX: 35.19 KG/M2 | HEIGHT: 62 IN | WEIGHT: 191.2 LBS

## 2025-03-07 DIAGNOSIS — Z78.0 POST-MENOPAUSAL: ICD-10-CM

## 2025-03-07 PROCEDURE — 77080 DXA BONE DENSITY AXIAL: CPT

## 2025-03-10 ENCOUNTER — RESULTS FOLLOW-UP (OUTPATIENT)
Dept: FAMILY MEDICINE CLINIC | Facility: CLINIC | Age: 65
End: 2025-03-10

## 2025-03-12 ENCOUNTER — OFFICE VISIT (OUTPATIENT)
Dept: FAMILY MEDICINE CLINIC | Facility: CLINIC | Age: 65
End: 2025-03-12
Payer: MEDICARE

## 2025-03-12 ENCOUNTER — APPOINTMENT (OUTPATIENT)
Dept: LAB | Facility: CLINIC | Age: 65
End: 2025-03-12
Payer: MEDICARE

## 2025-03-12 VITALS
OXYGEN SATURATION: 97 % | HEART RATE: 79 BPM | SYSTOLIC BLOOD PRESSURE: 128 MMHG | DIASTOLIC BLOOD PRESSURE: 82 MMHG | HEIGHT: 62 IN | BODY MASS INDEX: 35.66 KG/M2 | WEIGHT: 193.8 LBS

## 2025-03-12 DIAGNOSIS — J01.90 ACUTE BACTERIAL SINUSITIS: ICD-10-CM

## 2025-03-12 DIAGNOSIS — Z00.00 WELCOME TO MEDICARE PREVENTIVE VISIT: Primary | ICD-10-CM

## 2025-03-12 DIAGNOSIS — I10 BENIGN ESSENTIAL HTN: ICD-10-CM

## 2025-03-12 DIAGNOSIS — B96.89 ACUTE BACTERIAL SINUSITIS: ICD-10-CM

## 2025-03-12 LAB
ALBUMIN SERPL BCG-MCNC: 3.8 G/DL (ref 3.5–5)
ALP SERPL-CCNC: 83 U/L (ref 34–104)
ALT SERPL W P-5'-P-CCNC: 16 U/L (ref 7–52)
ANION GAP SERPL CALCULATED.3IONS-SCNC: 5 MMOL/L (ref 4–13)
AST SERPL W P-5'-P-CCNC: 16 U/L (ref 13–39)
BASOPHILS # BLD AUTO: 0.07 THOUSANDS/ÂΜL (ref 0–0.1)
BASOPHILS NFR BLD AUTO: 1 % (ref 0–1)
BILIRUB SERPL-MCNC: 0.38 MG/DL (ref 0.2–1)
BUN SERPL-MCNC: 14 MG/DL (ref 5–25)
CALCIUM SERPL-MCNC: 9 MG/DL (ref 8.4–10.2)
CHLORIDE SERPL-SCNC: 106 MMOL/L (ref 96–108)
CHOLEST SERPL-MCNC: 194 MG/DL (ref ?–200)
CO2 SERPL-SCNC: 31 MMOL/L (ref 21–32)
CREAT SERPL-MCNC: 0.71 MG/DL (ref 0.6–1.3)
EOSINOPHIL # BLD AUTO: 0.26 THOUSAND/ÂΜL (ref 0–0.61)
EOSINOPHIL NFR BLD AUTO: 3 % (ref 0–6)
ERYTHROCYTE [DISTWIDTH] IN BLOOD BY AUTOMATED COUNT: 13.3 % (ref 11.6–15.1)
GFR SERPL CREATININE-BSD FRML MDRD: 89 ML/MIN/1.73SQ M
GLUCOSE P FAST SERPL-MCNC: 91 MG/DL (ref 65–99)
HCT VFR BLD AUTO: 41.8 % (ref 34.8–46.1)
HDLC SERPL-MCNC: 62 MG/DL
HGB BLD-MCNC: 13.5 G/DL (ref 11.5–15.4)
IMM GRANULOCYTES # BLD AUTO: 0.04 THOUSAND/UL (ref 0–0.2)
IMM GRANULOCYTES NFR BLD AUTO: 1 % (ref 0–2)
LDLC SERPL CALC-MCNC: 118 MG/DL (ref 0–100)
LDLC SERPL DIRECT ASSAY-MCNC: 124 MG/DL (ref 0–100)
LYMPHOCYTES # BLD AUTO: 1.64 THOUSANDS/ÂΜL (ref 0.6–4.47)
LYMPHOCYTES NFR BLD AUTO: 21 % (ref 14–44)
MCH RBC QN AUTO: 28.5 PG (ref 26.8–34.3)
MCHC RBC AUTO-ENTMCNC: 32.3 G/DL (ref 31.4–37.4)
MCV RBC AUTO: 88 FL (ref 82–98)
MONOCYTES # BLD AUTO: 0.79 THOUSAND/ÂΜL (ref 0.17–1.22)
MONOCYTES NFR BLD AUTO: 10 % (ref 4–12)
NEUTROPHILS # BLD AUTO: 4.98 THOUSANDS/ÂΜL (ref 1.85–7.62)
NEUTS SEG NFR BLD AUTO: 64 % (ref 43–75)
NONHDLC SERPL-MCNC: 132 MG/DL
NRBC BLD AUTO-RTO: 0 /100 WBCS
PLATELET # BLD AUTO: 242 THOUSANDS/UL (ref 149–390)
PMV BLD AUTO: 12.3 FL (ref 8.9–12.7)
POTASSIUM SERPL-SCNC: 4 MMOL/L (ref 3.5–5.3)
PROT SERPL-MCNC: 7 G/DL (ref 6.4–8.4)
RBC # BLD AUTO: 4.74 MILLION/UL (ref 3.81–5.12)
SODIUM SERPL-SCNC: 142 MMOL/L (ref 135–147)
TRIGL SERPL-MCNC: 70 MG/DL (ref ?–150)
TSH SERPL DL<=0.05 MIU/L-ACNC: 2.09 UIU/ML (ref 0.45–4.5)
WBC # BLD AUTO: 7.78 THOUSAND/UL (ref 4.31–10.16)

## 2025-03-12 PROCEDURE — 84443 ASSAY THYROID STIM HORMONE: CPT

## 2025-03-12 PROCEDURE — 83721 ASSAY OF BLOOD LIPOPROTEIN: CPT

## 2025-03-12 PROCEDURE — 99173 VISUAL ACUITY SCREEN: CPT | Performed by: FAMILY MEDICINE

## 2025-03-12 PROCEDURE — G0403 EKG FOR INITIAL PREVENT EXAM: HCPCS | Performed by: FAMILY MEDICINE

## 2025-03-12 PROCEDURE — 80053 COMPREHEN METABOLIC PANEL: CPT

## 2025-03-12 PROCEDURE — G0402 INITIAL PREVENTIVE EXAM: HCPCS | Performed by: FAMILY MEDICINE

## 2025-03-12 PROCEDURE — G2211 COMPLEX E/M VISIT ADD ON: HCPCS | Performed by: FAMILY MEDICINE

## 2025-03-12 PROCEDURE — 85025 COMPLETE CBC W/AUTO DIFF WBC: CPT

## 2025-03-12 PROCEDURE — 80061 LIPID PANEL: CPT

## 2025-03-12 PROCEDURE — 36415 COLL VENOUS BLD VENIPUNCTURE: CPT

## 2025-03-12 PROCEDURE — 99213 OFFICE O/P EST LOW 20 MIN: CPT | Performed by: FAMILY MEDICINE

## 2025-03-12 RX ORDER — MUPIROCIN 20 MG/G
OINTMENT TOPICAL 2 TIMES DAILY
Qty: 30 G | Refills: 1 | Status: SHIPPED | OUTPATIENT
Start: 2025-03-12 | End: 2025-03-19

## 2025-03-12 RX ORDER — RIBOFLAVIN (VITAMIN B2) 100 MG
100 TABLET ORAL DAILY
COMMUNITY

## 2025-03-12 RX ORDER — AMOXICILLIN 500 MG/1
500 CAPSULE ORAL EVERY 8 HOURS SCHEDULED
Qty: 30 CAPSULE | Refills: 0 | Status: SHIPPED | OUTPATIENT
Start: 2025-03-12 | End: 2025-03-22

## 2025-03-12 NOTE — PROGRESS NOTES
Name: Gypsy Valdovinos      : 1960      MRN: [id number]  Encounter Provider: Robert Budinetz, MD  Encounter Date: 3/12/2025   Encounter department: [facility] PRIMARY CARE    Assessment & Plan  Welcome to Medicare preventive visit  Reviewed age-appropriate health maintenance and preventive care.           Acute bacterial sinusitis  Start abx and bactroban  Orders:    mupirocin (BACTROBAN) 2 % ointment; Apply topically 2 (two) times a day for 7 days    amoxicillin (AMOXIL) 500 mg capsule; Take 1 capsule (500 mg total) by mouth every 8 (eight) hours for 10 days       Preventive health issues were discussed with patient, and age appropriate screening tests were ordered as noted in patient's After Visit Summary. Personalized health advice and appropriate referrals for health education or preventive services given if needed, as noted in patient's After Visit Summary.    History of Present Illness     The patient is here for welcome to Medicare and feels like she has a sinus infection.  She has sinus pain and pressure postnasal drip nasal congestion without fever.  She has a sore in her right nostril as well.  As far as her annual Medicare she had blood work this morning the results of which are pending.  She is up-to-date on colon screening.  Her DEXA scan was normal.  She does have a gynecologist.  She has no active chest pain shortness of breath.  No issues with balance or falling.  She does not feel depressed.  She does not smoke.  She does not drink.       Patient Care Team:  Robert Budinetz, MD as PCP - General (Family Medicine)    Review of Systems   Respiratory: Negative.     Cardiovascular: Negative.    Gastrointestinal: Negative.      Medical History Reviewed by provider this encounter:  Tobacco  Allergies  Meds  Problems  Med Hx  Surg Hx  Fam Hx       Annual Wellness Visit Questionnaire   Gypsy is here for her Welcome to Medicare visit. Last Medicare Wellness visit information reviewed,  patient interviewed, no change since last AWV.     Health Risk Assessment:   Patient rates overall health as very good. Patient feels that their physical health rating is same. Patient is satisfied with their life. Eyesight was rated as slightly worse. Hearing was rated as same. Patient feels that their emotional and mental health rating is same. Patients states they are never, rarely angry. Patient states they are sometimes unusually tired/fatigued. Pain experienced in the last 7 days has been none. Patient states that she has experienced no weight loss or gain in last 6 months.     Depression Screening:   PHQ-2 Score: 0      Fall Risk Screening:   In the past year, patient has experienced: no history of falling in past year      Urinary Incontinence Screening:   Patient has leaked urine accidently in the last six months.     Home Safety:  Patient does not have trouble with stairs inside or outside of their home. Patient has working smoke alarms and has working carbon monoxide detector. Home safety hazards include: none.     Nutrition:   Current diet is Other (please comment). Too many carbs and sugar    Medications:   Patient is currently taking over-the-counter supplements. OTC medications include: see medication list. Patient is able to manage medications.     Activities of Daily Living (ADLs)/Instrumental Activities of Daily Living (IADLs):   Walk and transfer into and out of bed and chair?: Yes  Dress and groom yourself?: Yes    Bathe or shower yourself?: Yes    Feed yourself? Yes  Do your laundry/housekeeping?: Yes  Manage your money, pay your bills and track your expenses?: Yes  Make your own meals?: Yes    Do your own shopping?: Yes    Previous Hospitalizations:   Any hospitalizations or ED visits within the last 12 months?: No      PREVENTIVE SCREENINGS      Cardiovascular Screening:    General: Screening Current      Colorectal Cancer Screening:     General: Screening Current      Breast Cancer Screening:  "    General: Screening Current      Cervical Cancer Screening:    General: Screening Not Indicated      Lung Cancer Screening:     General: Screening Not Indicated      Hepatitis C Screening:    General: Screening Current    Screening, Brief Intervention, and Referral to Treatment (SBIRT)     Screening  Typical number of drinks in a day: 0  Typical number of drinks in a week: 0  Interpretation: Low risk drinking behavior.    AUDIT-C Screenin) How often did you have a drink containing alcohol in the past year? never  2) How many drinks did you have on a typical day when you were drinking in the past year? 0  3) How often did you have 6 or more drinks on one occasion in the past year? never    AUDIT-C Score: 0  Interpretation: Score 0-2 (female): Negative screen for alcohol misuse    Social Drivers of Health     Food Insecurity: No Food Insecurity (3/12/2025)    Hunger Vital Sign     Worried About Running Out of Food in the Last Year: Never true     Ran Out of Food in the Last Year: Never true   Transportation Needs: No Transportation Needs (3/12/2025)    PRAPARE - Transportation     Lack of Transportation (Medical): No     Lack of Transportation (Non-Medical): No   Housing Stability: Low Risk  (3/12/2025)    Housing Stability Vital Sign     Unable to Pay for Housing in the Last Year: No     Number of Times Moved in the Last Year: 1     Homeless in the Last Year: No   Utilities: Not At Risk (3/12/2025)    [facility] Utilities     Threatened with loss of utilities: No     Vision Screening    Right eye Left eye Both eyes   Without correction      With correction 20/15 20/15 20/15       Objective   /82 (BP Location: Left arm, Patient Position: Sitting, Cuff Size: Large)   Pulse 79   Ht 5' 2\" (1.575 m)   Wt 87.9 kg (193 lb 12.8 oz)   SpO2 97%   BMI 35.45 kg/m²     Physical Exam  Skin:     Comments: Sore in right nare  B/l nasal congestion  B/l maxillary sinus tenderness       "

## 2025-03-24 ENCOUNTER — TELEPHONE (OUTPATIENT)
Age: 65
End: 2025-03-24

## 2025-03-24 DIAGNOSIS — H53.9 VISUAL CHANGES: Primary | ICD-10-CM

## 2025-03-24 NOTE — TELEPHONE ENCOUNTER
Patient stated she and spouse will be going to see eye doctor this Thursday and they were informed they needed a script/referral to [facility], Dr Gupta. Please fax to [phone]; Phone #[phone]. Patient would like a call back once it has been faxed; [phone].

## 2025-04-11 PROBLEM — Z00.00 WELCOME TO MEDICARE PREVENTIVE VISIT: Status: RESOLVED | Noted: 2024-02-27 | Resolved: 2025-04-11

## 2025-05-27 DIAGNOSIS — I10 BENIGN ESSENTIAL HTN: ICD-10-CM

## 2025-05-27 NOTE — TELEPHONE ENCOUNTER
Last visit 03/12/2025  Next appointment 09/17/2025    Medication: lisinopril (zestril) 10 mg tablet    Dose/Frequency: Take 1 tablet (10 mg total) by mouth daily    Quantity: 90 days    Pharmacy: [facility] [id number] [facility]    Office:   [x] PCP/Provider - Robert Budinetz, MD  [] Speciality/Provider -     Does the patient have enough for 3 days?   [x] Yes   [] No - Send as HP to POD     PDMP reviewed; no aberrant behavior identified, prescription authorized.

## 2025-05-28 RX ORDER — LISINOPRIL 10 MG/1
10 TABLET ORAL DAILY
Qty: 90 TABLET | Refills: 1 | Status: SHIPPED | OUTPATIENT
Start: 2025-05-28

## 2025-06-16 ENCOUNTER — APPOINTMENT (OUTPATIENT)
Dept: LAB | Facility: CLINIC | Age: 65
End: 2025-06-16
Payer: MEDICARE

## 2025-06-16 ENCOUNTER — OFFICE VISIT (OUTPATIENT)
Dept: FAMILY MEDICINE CLINIC | Facility: CLINIC | Age: 65
End: 2025-06-16
Payer: MEDICARE

## 2025-06-16 VITALS
WEIGHT: 194.8 LBS | BODY MASS INDEX: 35.85 KG/M2 | OXYGEN SATURATION: 98 % | SYSTOLIC BLOOD PRESSURE: 132 MMHG | HEIGHT: 62 IN | DIASTOLIC BLOOD PRESSURE: 80 MMHG | HEART RATE: 80 BPM

## 2025-06-16 DIAGNOSIS — G44.209 TENSION HEADACHE: Primary | ICD-10-CM

## 2025-06-16 DIAGNOSIS — R53.83 OTHER FATIGUE: ICD-10-CM

## 2025-06-16 DIAGNOSIS — R51.9 PRESSURE IN HEAD: ICD-10-CM

## 2025-06-16 DIAGNOSIS — G44.209 TENSION HEADACHE: ICD-10-CM

## 2025-06-16 DIAGNOSIS — E46 PROTEIN-CALORIE MALNUTRITION, UNSPECIFIED SEVERITY (HCC): ICD-10-CM

## 2025-06-16 DIAGNOSIS — R01.1 HEART MURMUR, SYSTOLIC: ICD-10-CM

## 2025-06-16 DIAGNOSIS — R42 LIGHTHEADEDNESS: ICD-10-CM

## 2025-06-16 DIAGNOSIS — E55.9 VITAMIN D DEFICIENCY: ICD-10-CM

## 2025-06-16 LAB
25(OH)D3 SERPL-MCNC: 21.9 NG/ML (ref 30–100)
ALBUMIN SERPL BCG-MCNC: 4 G/DL (ref 3.5–5)
ALP SERPL-CCNC: 88 U/L (ref 34–104)
ALT SERPL W P-5'-P-CCNC: 17 U/L (ref 7–52)
ANION GAP SERPL CALCULATED.3IONS-SCNC: 7 MMOL/L (ref 4–13)
AST SERPL W P-5'-P-CCNC: 17 U/L (ref 13–39)
BASOPHILS # BLD AUTO: 0.06 THOUSANDS/ÂΜL (ref 0–0.1)
BASOPHILS NFR BLD AUTO: 1 % (ref 0–1)
BILIRUB SERPL-MCNC: 0.43 MG/DL (ref 0.2–1)
BUN SERPL-MCNC: 16 MG/DL (ref 5–25)
CALCIUM SERPL-MCNC: 9.2 MG/DL (ref 8.4–10.2)
CHLORIDE SERPL-SCNC: 106 MMOL/L (ref 96–108)
CO2 SERPL-SCNC: 31 MMOL/L (ref 21–32)
CREAT SERPL-MCNC: 0.65 MG/DL (ref 0.6–1.3)
CRP SERPL QL: 6.8 MG/L
EOSINOPHIL # BLD AUTO: 0.25 THOUSAND/ÂΜL (ref 0–0.61)
EOSINOPHIL NFR BLD AUTO: 3 % (ref 0–6)
ERYTHROCYTE [DISTWIDTH] IN BLOOD BY AUTOMATED COUNT: 13.2 % (ref 11.6–15.1)
GFR SERPL CREATININE-BSD FRML MDRD: 93 ML/MIN/1.73SQ M
GLUCOSE P FAST SERPL-MCNC: 87 MG/DL (ref 65–99)
HCT VFR BLD AUTO: 41 % (ref 34.8–46.1)
HGB BLD-MCNC: 13.2 G/DL (ref 11.5–15.4)
IMM GRANULOCYTES # BLD AUTO: 0.04 THOUSAND/UL (ref 0–0.2)
IMM GRANULOCYTES NFR BLD AUTO: 1 % (ref 0–2)
LYMPHOCYTES # BLD AUTO: 1.3 THOUSANDS/ÂΜL (ref 0.6–4.47)
LYMPHOCYTES NFR BLD AUTO: 17 % (ref 14–44)
MCH RBC QN AUTO: 28.8 PG (ref 26.8–34.3)
MCHC RBC AUTO-ENTMCNC: 32.2 G/DL (ref 31.4–37.4)
MCV RBC AUTO: 89 FL (ref 82–98)
MONOCYTES # BLD AUTO: 0.8 THOUSAND/ÂΜL (ref 0.17–1.22)
MONOCYTES NFR BLD AUTO: 10 % (ref 4–12)
NEUTROPHILS # BLD AUTO: 5.43 THOUSANDS/ÂΜL (ref 1.85–7.62)
NEUTS SEG NFR BLD AUTO: 68 % (ref 43–75)
NRBC BLD AUTO-RTO: 0 /100 WBCS
PLATELET # BLD AUTO: 247 THOUSANDS/UL (ref 149–390)
PMV BLD AUTO: 12.7 FL (ref 8.9–12.7)
POTASSIUM SERPL-SCNC: 4.1 MMOL/L (ref 3.5–5.3)
PROT SERPL-MCNC: 7 G/DL (ref 6.4–8.4)
RBC # BLD AUTO: 4.59 MILLION/UL (ref 3.81–5.12)
SODIUM SERPL-SCNC: 144 MMOL/L (ref 135–147)
T4 FREE SERPL-MCNC: 0.86 NG/DL (ref 0.61–1.12)
TSH SERPL DL<=0.05 MIU/L-ACNC: 2.26 UIU/ML (ref 0.45–4.5)
VIT B12 SERPL-MCNC: 197 PG/ML (ref 180–914)
WBC # BLD AUTO: 7.88 THOUSAND/UL (ref 4.31–10.16)

## 2025-06-16 PROCEDURE — 84439 ASSAY OF FREE THYROXINE: CPT

## 2025-06-16 PROCEDURE — 80053 COMPREHEN METABOLIC PANEL: CPT

## 2025-06-16 PROCEDURE — 86140 C-REACTIVE PROTEIN: CPT

## 2025-06-16 PROCEDURE — 99214 OFFICE O/P EST MOD 30 MIN: CPT | Performed by: NURSE PRACTITIONER

## 2025-06-16 PROCEDURE — 36415 COLL VENOUS BLD VENIPUNCTURE: CPT

## 2025-06-16 PROCEDURE — 85025 COMPLETE CBC W/AUTO DIFF WBC: CPT

## 2025-06-16 PROCEDURE — 82306 VITAMIN D 25 HYDROXY: CPT

## 2025-06-16 PROCEDURE — 93000 ELECTROCARDIOGRAM COMPLETE: CPT | Performed by: NURSE PRACTITIONER

## 2025-06-16 PROCEDURE — 84443 ASSAY THYROID STIM HORMONE: CPT

## 2025-06-16 PROCEDURE — 82607 VITAMIN B-12: CPT

## 2025-06-16 PROCEDURE — G2211 COMPLEX E/M VISIT ADD ON: HCPCS | Performed by: NURSE PRACTITIONER

## 2025-06-16 NOTE — PROGRESS NOTES
Name: Gypsy Valdovinos      : 1960      MRN: [id number]  Encounter Provider: JOHN Dey  Encounter Date: 2025   Encounter department: [facility]  :  Assessment & Plan  Tension headache  ECG completed, labs ordered also.    Orders:  •  CBC and differential; Future  •  Comprehensive metabolic panel; Future  •  Vitamin B12; Future  •  C-reactive protein; Future  •  Vitamin D 25 hydroxy; Future  •  TSH, 3rd generation; Future  •  T4, free; Future    Other fatigue    Orders:  •  CBC and differential; Future  •  Comprehensive metabolic panel; Future  •  Vitamin B12; Future  •  C-reactive protein; Future  •  Vitamin D 25 hydroxy; Future  •  TSH, 3rd generation; Future  •  T4, free; Future  •  POCT ECG  •  Echo complete w/ contrast if indicated; Future    Pressure in head    Orders:  •  CBC and differential; Future  •  Comprehensive metabolic panel; Future  •  Vitamin B12; Future  •  C-reactive protein; Future  •  Vitamin D 25 hydroxy; Future  •  TSH, 3rd generation; Future  •  T4, free; Future  •  POCT ECG    Protein-calorie malnutrition, unspecified severity (HCC)    Orders:  •  Vitamin B12; Future    Vitamin D deficiency    Orders:  •  Vitamin D 25 hydroxy; Future    Heart murmur, systolic  ECHO ordered to further assess the status of her murmur - she has not had one done for several years.    Orders:  •  Echo complete w/ contrast if indicated; Future    Lightheadedness    Orders:  •  Echo complete w/ contrast if indicated; Future      Will further look into different levels via blood work, and also an ECHO to assess valves, EF.       History of Present Illness   Here for an acute visit - reports onset of head pressure, feeling fatigue, and overall not herself for several days.  The headache feels like tightening or a vice around her forehead.  She is concerned with her heart as this pain is new to her.      Fatigue  Associated symptoms include fatigue and headaches.  "  Headache    Review of Systems   Constitutional:  Positive for fatigue.   Respiratory: Negative.     Cardiovascular: Negative.    Neurological:  Positive for headaches.   All other systems reviewed and are negative.      Objective   /80 (BP Location: Right arm, Patient Position: Sitting, Cuff Size: Standard)   Pulse 80   Ht 5' 2\" (1.575 m)   Wt 88.4 kg (194 lb 12.8 oz)   SpO2 98%   BMI 35.63 kg/m²      Physical Exam  HENT:      Head: Normocephalic.     Eyes:      Pupils: Pupils are equal, round, and reactive to light.       Cardiovascular:      Rate and Rhythm: Normal rate and regular rhythm.      Heart sounds: Normal heart sounds.   Pulmonary:      Effort: Pulmonary effort is normal.      Breath sounds: Normal breath sounds.     Neurological:      General: No focal deficit present.      Mental Status: She is alert and oriented to person, place, and time.           "

## 2025-06-18 ENCOUNTER — RESULTS FOLLOW-UP (OUTPATIENT)
Dept: FAMILY MEDICINE CLINIC | Facility: CLINIC | Age: 65
End: 2025-06-18

## 2025-06-18 DIAGNOSIS — R79.82 ELEVATED C-REACTIVE PROTEIN (CRP): ICD-10-CM

## 2025-06-18 DIAGNOSIS — E55.9 VITAMIN D DEFICIENCY: Primary | ICD-10-CM

## 2025-06-23 ENCOUNTER — HOSPITAL ENCOUNTER (OUTPATIENT)
Dept: NON INVASIVE DIAGNOSTICS | Facility: HOSPITAL | Age: 65
Discharge: HOME | End: 2025-06-23
Attending: NURSE PRACTITIONER
Payer: MEDICARE

## 2025-06-23 VITALS
BODY MASS INDEX: 35.7 KG/M2 | HEIGHT: 62 IN | HEART RATE: 80 BPM | WEIGHT: 194 LBS | DIASTOLIC BLOOD PRESSURE: 80 MMHG | SYSTOLIC BLOOD PRESSURE: 132 MMHG

## 2025-06-23 DIAGNOSIS — R01.1 HEART MURMUR, SYSTOLIC: ICD-10-CM

## 2025-06-23 DIAGNOSIS — R42 LIGHTHEADEDNESS: ICD-10-CM

## 2025-06-23 DIAGNOSIS — R53.83 OTHER FATIGUE: ICD-10-CM

## 2025-06-23 LAB
AORTIC ROOT: 2.3 CM
AORTIC VALVE MEAN VELOCITY: 20.5 M/S
ASCENDING AORTA: 2.8 CM
AV LVOT MEAN GRADIENT: 16 MMHG
AV LVOT PEAK GRADIENT: 30 MMHG
AV MEAN PRESS GRAD SYS DOP V1V2: 20 MMHG
AV PEAK GRADIENT: 40 MMHG
AV VELOCITY RATIO: 0.87
AV VMAX SYS DOP: 3.15 M/S
BSA FOR ECHO PROCEDURE: 1.89 M2
DOP CALC AO VTI: 61.17 CM
DOP CALC LVOT PEAK VEL VTI: 53.33 CM
DOP CALC LVOT PEAK VEL: 2.71 M/S
E WAVE DECELERATION TIME: 171 MS
E/A RATIO: 0.89
FRACTIONAL SHORTENING: 31 (ref 28–44)
INTERVENTRICULAR SEPTUM IN DIASTOLE (PARASTERNAL SHORT AXIS VIEW): 0.9 CM
INTERVENTRICULAR SEPTUM: 0.9 CM (ref 0.6–1.1)
LAAS-AP2: 12.5 CM2
LAAS-AP4: 13.9 CM2
LEFT ATRIUM SIZE: 3.5 CM
LEFT ATRIUM VOLUME (MOD BIPLANE): 29 ML
LEFT ATRIUM VOLUME INDEX (MOD BIPLANE): 15.3 ML/M2
LEFT INTERNAL DIMENSION IN SYSTOLE: 2.7 CM (ref 2.1–4)
LEFT VENTRICULAR INTERNAL DIMENSION IN DIASTOLE: 3.9 CM (ref 3.5–6)
LEFT VENTRICULAR POSTERIOR WALL IN END DIASTOLE: 1 CM
LEFT VENTRICULAR STROKE VOLUME: 39 ML
LV EF US.2D.A4C+ESTIMATED: 67 %
LVSV (TEICH): 39 ML
MV E'TISSUE VEL-LAT: 10 CM/S
MV E'TISSUE VEL-SEP: 7 CM/S
MV PEAK A VEL: 1.09 M/S
MV PEAK E VEL: 97 CM/S
MV STENOSIS PRESSURE HALF TIME: 50 MS
MV VALVE AREA P 1/2 METHOD: 4.4
RIGHT ATRIUM AREA SYSTOLE A4C: 9.6 CM2
RIGHT VENTRICLE ID DIMENSION: 3.1 CM
SL CV LEFT ATRIUM LENGTH A2C: 5 CM
SL CV LV EF: 67
SL CV PED ECHO LEFT VENTRICLE DIASTOLIC VOLUME (MOD BIPLANE) 2D: 67 ML
SL CV PED ECHO LEFT VENTRICLE SYSTOLIC VOLUME (MOD BIPLANE) 2D: 28 ML
TR MAX PG: 10 MMHG
TR PEAK VELOCITY: 1.6 M/S
TRICUSPID ANNULAR PLANE SYSTOLIC EXCURSION: 2.3 CM
TRICUSPID VALVE PEAK REGURGITATION VELOCITY: 1.57 M/S

## 2025-06-23 PROCEDURE — 93306 TTE W/DOPPLER COMPLETE: CPT

## 2025-06-24 NOTE — TELEPHONE ENCOUNTER
I contacted pt and read your questions and responses to her. Pt states she had the test done due to feeling pressure in her forehead as well as sharp pains that last a minute and come about randomly. Pt states she was also having a lot of fatigue. Pt states she was never told anything other than that she has had a heart murmur since childhood. Pt states she saw a cardiologist in [facility] Many years ago. Pt is not claustrophobic and is fine with proceeding with your course of testing and tx.

## 2025-06-24 NOTE — TELEPHONE ENCOUNTER
Left detailed message for patient that printed results are at  / she will need to sign a records release form before we can release to patient    Printed results and release form are at  awaiting for patients arrival

## 2025-06-24 NOTE — TELEPHONE ENCOUNTER
Patient stated she is unable to get into [facility] to see the actual numbers for her ECHO test. She has been waiting to hear back from [facility] support. She would like to come to office today and  a print out of her ECHO results. She would like a call back once it's available for ; [phone]. Patient scheduled appointment for 7/3/25 to go discuss results with Dr Budinetz but was hoping for something for tomorrow or Friday.

## 2025-06-24 NOTE — TELEPHONE ENCOUNTER
----- Message from JOHN Dey sent at 6/24/2025  8:09 AM EDT -----  Can we reach out to Gypsy gold - I reviewed the recent heart ultrasound - It is showing some changes to the one she had in 2019. Can we ask her what led to her getting one at that time?      The current ECHO is showing that there is increased pressure in the heart during a resting phase - she had this in her last US but it has mildly worsened over the past 6 years.     There is concern that the cause of this may be due to some possible extra muscle membrane in the heart.  Has she ever been told that before?      Can we also ask if she has ever seen cardiology?  I will need to refer her to them for evaluation.  Radiology has also recommended an additional scan to look further at the heart.  Can we ask if she   is claustrophobic - it is possible I would order an MRI in the interim.      She is [facility]'s patient though - if she would like to see him first, that is fine.  Thanks.    ----- Message -----  From: Bebo Woods Jr., MD  Sent: 6/23/2025   3:40 PM EDT  To: JOHN Dey

## 2025-07-16 ENCOUNTER — OFFICE VISIT (OUTPATIENT)
Dept: FAMILY MEDICINE CLINIC | Facility: CLINIC | Age: 65
End: 2025-07-16
Payer: MEDICARE

## 2025-07-16 VITALS
HEART RATE: 84 BPM | WEIGHT: 191.4 LBS | BODY MASS INDEX: 35.22 KG/M2 | OXYGEN SATURATION: 97 % | HEIGHT: 62 IN | DIASTOLIC BLOOD PRESSURE: 68 MMHG | SYSTOLIC BLOOD PRESSURE: 134 MMHG

## 2025-07-16 DIAGNOSIS — J02.9 PHARYNGITIS, UNSPECIFIED ETIOLOGY: ICD-10-CM

## 2025-07-16 DIAGNOSIS — I10 BENIGN ESSENTIAL HTN: Primary | ICD-10-CM

## 2025-07-16 PROCEDURE — 99214 OFFICE O/P EST MOD 30 MIN: CPT | Performed by: FAMILY MEDICINE

## 2025-07-16 PROCEDURE — G2211 COMPLEX E/M VISIT ADD ON: HCPCS | Performed by: FAMILY MEDICINE

## 2025-07-16 RX ORDER — OMEGA-3S/DHA/EPA/FISH OIL/D3 300MG-1000
5000 CAPSULE ORAL DAILY
COMMUNITY

## 2025-07-16 RX ORDER — LISINOPRIL 10 MG/1
10 TABLET ORAL DAILY
Qty: 90 TABLET | Refills: 1 | Status: SHIPPED | OUTPATIENT
Start: 2025-07-16

## 2025-07-16 RX ORDER — AZITHROMYCIN 250 MG/1
TABLET, FILM COATED ORAL
Qty: 6 TABLET | Refills: 0 | Status: SHIPPED | OUTPATIENT
Start: 2025-07-16 | End: 2025-07-21

## 2025-07-16 NOTE — ASSESSMENT & PLAN NOTE
Continue lisinopril    Orders:    lisinopril (ZESTRIL) 10 mg tablet; Take 1 tablet (10 mg total) by mouth daily

## 2025-07-16 NOTE — PROGRESS NOTES
"Name: Gypsy Valdovinos      : 1960      MRN: [id number]  Encounter Provider: Robert Budinetz, MD  Encounter Date: 2025   Encounter department: [facility]  :  Assessment & Plan  Benign essential HTN  Continue lisinopril    Orders:    lisinopril (ZESTRIL) 10 mg tablet; Take 1 tablet (10 mg total) by mouth daily    Pharyngitis, unspecified etiology    Orders:    azithromycin (Zithromax) 250 mg tablet; Take 2 tablets (500 mg total) by mouth daily for 1 day, THEN 1 tablet (250 mg total) daily for 4 days.    Start a zpak       History of Present Illness   The patient's echocardiogram was normal.  Her blood pressure stable and well-controlled on lisinopril.  It has been is now cancer free and doing well from his lymphoma he is immunocompromise.  She has a sore throat without fever.  He is      Review of Systems   Respiratory: Negative.     Cardiovascular: Negative.        Objective   /68 (BP Location: Left arm, Patient Position: Sitting, Cuff Size: Standard)   Pulse 84   Ht 5' 2\" (1.575 m)   Wt 86.8 kg (191 lb 6.4 oz)   SpO2 97%   BMI 35.01 kg/m²      Physical Exam  Vitals and nursing note reviewed.   Constitutional:       General: She is not in acute distress.     Appearance: She is well-developed.   HENT:      Head: Normocephalic and atraumatic.     Eyes:      Conjunctiva/sclera: Conjunctivae normal.       Cardiovascular:      Rate and Rhythm: Normal rate and regular rhythm.      Heart sounds: No murmur heard.  Pulmonary:      Effort: Pulmonary effort is normal. No respiratory distress.      Breath sounds: Normal breath sounds.   Abdominal:      Palpations: Abdomen is soft.      Tenderness: There is no abdominal tenderness.     Musculoskeletal:         General: No swelling.      Cervical back: Neck supple.     Skin:     General: Skin is warm and dry.      Capillary Refill: Capillary refill takes less than 2 seconds.     Neurological:      Mental Status: She is alert. "     Psychiatric:         Mood and Affect: Mood normal.

## 2025-08-13 ENCOUNTER — OFFICE VISIT (OUTPATIENT)
Dept: FAMILY MEDICINE CLINIC | Facility: CLINIC | Age: 65
End: 2025-08-13
Payer: MEDICARE

## 2025-08-13 PROCEDURE — 87086 URINE CULTURE/COLONY COUNT: CPT | Performed by: FAMILY MEDICINE

## 2025-08-22 ENCOUNTER — TELEPHONE (OUTPATIENT)
Dept: SLEEP CENTER | Facility: CLINIC | Age: 65
End: 2025-08-22

## 2025-08-22 DIAGNOSIS — G47.19 EXCESSIVE DAYTIME SLEEPINESS: Primary | ICD-10-CM
